# Patient Record
Sex: MALE | Employment: FULL TIME | URBAN - METROPOLITAN AREA
[De-identification: names, ages, dates, MRNs, and addresses within clinical notes are randomized per-mention and may not be internally consistent; named-entity substitution may affect disease eponyms.]

---

## 2024-09-12 ENCOUNTER — OFFICE VISIT (OUTPATIENT)
Dept: ORTHOPEDIC SURGERY | Facility: HOSPITAL | Age: 54
End: 2024-09-12
Payer: MEDICARE

## 2024-09-12 DIAGNOSIS — S46.211A BICEPS RUPTURE, DISTAL, RIGHT, INITIAL ENCOUNTER: Primary | ICD-10-CM

## 2024-09-12 PROCEDURE — 99213 OFFICE O/P EST LOW 20 MIN: CPT | Performed by: ORTHOPAEDIC SURGERY

## 2024-09-12 PROCEDURE — 1036F TOBACCO NON-USER: CPT | Performed by: ORTHOPAEDIC SURGERY

## 2024-09-12 PROCEDURE — 99203 OFFICE O/P NEW LOW 30 MIN: CPT | Performed by: ORTHOPAEDIC SURGERY

## 2024-09-12 RX ORDER — SODIUM CHLORIDE, SODIUM LACTATE, POTASSIUM CHLORIDE, CALCIUM CHLORIDE 600; 310; 30; 20 MG/100ML; MG/100ML; MG/100ML; MG/100ML
20 INJECTION, SOLUTION INTRAVENOUS CONTINUOUS
OUTPATIENT
Start: 2024-09-12

## 2024-09-12 ASSESSMENT — PAIN SCALES - GENERAL: PAINLEVEL_OUTOF10: 0 - NO PAIN

## 2024-09-12 ASSESSMENT — ENCOUNTER SYMPTOMS
MUSCULOSKELETAL NEGATIVE: 1
FATIGUE: 0
SHORTNESS OF BREATH: 0
HEMATOLOGIC/LYMPHATIC NEGATIVE: 1
FEVER: 0
CHILLS: 0
WHEEZING: 0

## 2024-09-12 ASSESSMENT — PAIN - FUNCTIONAL ASSESSMENT: PAIN_FUNCTIONAL_ASSESSMENT: 0-10

## 2024-09-12 NOTE — PROGRESS NOTES
Reason for Appointment  Chief Complaint   Patient presents with    Right Forearm - Pain     History of Present Illness  New patient is a 54 y.o. male here today for evaluation of right elbow pain, pleasant gentleman was lifting a dumbbell last Tuesday and felt a pop classic bruising for distal biceps rupture deformity.  Interesting note that sometime ago he had an injury when he was lifting and had a partial rupture but no bruising in the left elbow is not had any treatment this is weaker but the right elbow takes precedence never had an injury before no severe medical history not on any blood thinners pain is minimal but he feels weak has had bruising no numbness full history reviewed..     History reviewed. No pertinent past medical history.    History reviewed. No pertinent surgical history.    Medication Documentation Review Audit       Reviewed by Jose Barajas MD (Physician) on 09/12/24 at 1236      Medication Order Taking? Sig Documenting Provider Last Dose Status            No Medications to Display                                   No Known Allergies    Review of Systems   Constitutional:  Negative for chills, fatigue and fever.   Respiratory:  Negative for shortness of breath and wheezing.    Cardiovascular:  Negative for chest pain and leg swelling.   Musculoskeletal: Negative.    Skin: Negative.    Hematological: Negative.        Exam   On examination patient is alert awake in no acute distress oriented person place and time mood is good good shoulder wrist range of motion elbow shows bruising classic distal biceps rupture proximal migration of the muscle belly still good sensation pulses distally no elbow instability no other bony tenderness throughout the upper extremity  Assessment   Encounter Diagnosis   Name Primary?    Biceps rupture, distal, right, initial encounter Yes       Plan   I had a long discussion with the patient today and went into depth about open repair of the distal biceps and  reinsertion.  He understands the risk especially to the posterior interosseous nerve and the lateral antebrachial cutaneous nerve.  We talked about surgery and the long recovery phase he wishes to proceed informed consent obtained.  I would like to get a stat MRI to assure the amount of retraction and location of the biceps tendon and any other associated injuries that can help with surgical intervention.

## 2024-09-13 PROBLEM — S46.211A BICEPS RUPTURE, DISTAL, RIGHT, INITIAL ENCOUNTER: Status: ACTIVE | Noted: 2024-09-12

## 2024-09-16 ENCOUNTER — HOSPITAL ENCOUNTER (OUTPATIENT)
Dept: RADIOLOGY | Facility: HOSPITAL | Age: 54
Discharge: HOME | End: 2024-09-16
Payer: COMMERCIAL

## 2024-09-16 ENCOUNTER — PREP FOR PROCEDURE (OUTPATIENT)
Dept: ORTHOPEDIC SURGERY | Facility: CLINIC | Age: 54
End: 2024-09-16
Payer: COMMERCIAL

## 2024-09-16 DIAGNOSIS — S46.211A BICEPS RUPTURE, DISTAL, RIGHT, INITIAL ENCOUNTER: ICD-10-CM

## 2024-09-16 PROCEDURE — 73221 MRI JOINT UPR EXTREM W/O DYE: CPT | Mod: RIGHT SIDE | Performed by: RADIOLOGY

## 2024-09-16 PROCEDURE — 73221 MRI JOINT UPR EXTREM W/O DYE: CPT | Mod: RT

## 2024-09-17 ENCOUNTER — PRE-ADMISSION TESTING (OUTPATIENT)
Dept: PREADMISSION TESTING | Facility: HOSPITAL | Age: 54
End: 2024-09-17
Payer: COMMERCIAL

## 2024-09-17 ENCOUNTER — LAB (OUTPATIENT)
Dept: LAB | Facility: LAB | Age: 54
End: 2024-09-17
Payer: COMMERCIAL

## 2024-09-17 VITALS
HEART RATE: 75 BPM | WEIGHT: 229.5 LBS | SYSTOLIC BLOOD PRESSURE: 179 MMHG | RESPIRATION RATE: 16 BRPM | TEMPERATURE: 98.2 F | DIASTOLIC BLOOD PRESSURE: 93 MMHG | HEIGHT: 72 IN | BODY MASS INDEX: 31.08 KG/M2 | OXYGEN SATURATION: 96 %

## 2024-09-17 DIAGNOSIS — S46.211A BICEPS RUPTURE, DISTAL, RIGHT, INITIAL ENCOUNTER: ICD-10-CM

## 2024-09-17 DIAGNOSIS — Z01.818 PREOP TESTING: Primary | ICD-10-CM

## 2024-09-17 DIAGNOSIS — Z01.818 PREOP TESTING: ICD-10-CM

## 2024-09-17 LAB
ALBUMIN SERPL BCP-MCNC: 4.9 G/DL (ref 3.4–5)
ALP SERPL-CCNC: 62 U/L (ref 33–120)
ALT SERPL W P-5'-P-CCNC: 26 U/L (ref 10–52)
ANION GAP SERPL CALC-SCNC: 14 MMOL/L (ref 10–20)
AST SERPL W P-5'-P-CCNC: 23 U/L (ref 9–39)
ATRIAL RATE: 73 BPM
BASOPHILS # BLD AUTO: 0.05 X10*3/UL (ref 0–0.1)
BASOPHILS NFR BLD AUTO: 0.9 %
BILIRUB SERPL-MCNC: 0.4 MG/DL (ref 0–1.2)
BUN SERPL-MCNC: 17 MG/DL (ref 6–23)
CALCIUM SERPL-MCNC: 10.1 MG/DL (ref 8.6–10.3)
CHLORIDE SERPL-SCNC: 99 MMOL/L (ref 98–107)
CO2 SERPL-SCNC: 27 MMOL/L (ref 21–32)
CREAT SERPL-MCNC: 1.03 MG/DL (ref 0.5–1.3)
EGFRCR SERPLBLD CKD-EPI 2021: 87 ML/MIN/1.73M*2
EOSINOPHIL # BLD AUTO: 0.15 X10*3/UL (ref 0–0.7)
EOSINOPHIL NFR BLD AUTO: 2.6 %
ERYTHROCYTE [DISTWIDTH] IN BLOOD BY AUTOMATED COUNT: 11.2 % (ref 11.5–14.5)
GLUCOSE SERPL-MCNC: 111 MG/DL (ref 74–99)
HCT VFR BLD AUTO: 45.2 % (ref 41–52)
HGB BLD-MCNC: 16.2 G/DL (ref 13.5–17.5)
IMM GRANULOCYTES # BLD AUTO: 0.01 X10*3/UL (ref 0–0.7)
IMM GRANULOCYTES NFR BLD AUTO: 0.2 % (ref 0–0.9)
LYMPHOCYTES # BLD AUTO: 2.15 X10*3/UL (ref 1.2–4.8)
LYMPHOCYTES NFR BLD AUTO: 37.3 %
MCH RBC QN AUTO: 33.1 PG (ref 26–34)
MCHC RBC AUTO-ENTMCNC: 35.8 G/DL (ref 32–36)
MCV RBC AUTO: 92 FL (ref 80–100)
MONOCYTES # BLD AUTO: 0.5 X10*3/UL (ref 0.1–1)
MONOCYTES NFR BLD AUTO: 8.7 %
NEUTROPHILS # BLD AUTO: 2.9 X10*3/UL (ref 1.2–7.7)
NEUTROPHILS NFR BLD AUTO: 50.3 %
NRBC BLD-RTO: ABNORMAL /100{WBCS}
P AXIS: 70 DEGREES
P OFFSET: 195 MS
P ONSET: 137 MS
PLATELET # BLD AUTO: 391 X10*3/UL (ref 150–450)
POTASSIUM SERPL-SCNC: 4.1 MMOL/L (ref 3.5–5.3)
PR INTERVAL: 162 MS
PROT SERPL-MCNC: 7.8 G/DL (ref 6.4–8.2)
Q ONSET: 218 MS
QRS COUNT: 12 BEATS
QRS DURATION: 92 MS
QT INTERVAL: 392 MS
QTC CALCULATION(BAZETT): 431 MS
QTC FREDERICIA: 418 MS
R AXIS: 65 DEGREES
RBC # BLD AUTO: 4.9 X10*6/UL (ref 4.5–5.9)
SODIUM SERPL-SCNC: 136 MMOL/L (ref 136–145)
T AXIS: 54 DEGREES
T OFFSET: 414 MS
VENTRICULAR RATE: 73 BPM
WBC # BLD AUTO: 5.8 X10*3/UL (ref 4.4–11.3)

## 2024-09-17 PROCEDURE — 93005 ELECTROCARDIOGRAM TRACING: CPT

## 2024-09-17 PROCEDURE — 80053 COMPREHEN METABOLIC PANEL: CPT

## 2024-09-17 PROCEDURE — 36415 COLL VENOUS BLD VENIPUNCTURE: CPT

## 2024-09-17 PROCEDURE — 85025 COMPLETE CBC W/AUTO DIFF WBC: CPT

## 2024-09-17 PROCEDURE — 99203 OFFICE O/P NEW LOW 30 MIN: CPT

## 2024-09-17 PROCEDURE — 93010 ELECTROCARDIOGRAM REPORT: CPT | Performed by: INTERNAL MEDICINE

## 2024-09-17 RX ORDER — BISMUTH SUBSALICYLATE 262 MG
1 TABLET,CHEWABLE ORAL DAILY
COMMUNITY

## 2024-09-17 RX ORDER — IBUPROFEN 100 MG/5ML
1000 SUSPENSION, ORAL (FINAL DOSE FORM) ORAL DAILY
COMMUNITY

## 2024-09-17 ASSESSMENT — DUKE ACTIVITY SCORE INDEX (DASI)
CAN YOU WALK INDOORS, SUCH AS AROUND YOUR HOUSE: YES
CAN YOU TAKE CARE OF YOURSELF (EAT, DRESS, BATHE, OR USE TOILET): YES
CAN YOU WALK A BLOCK OR TWO ON LEVEL GROUND: YES
CAN YOU DO MODERATE WORK AROUND THE HOUSE LIKE VACUUMING, SWEEPING FLOORS OR CARRYING GROCERIES: YES
CAN YOU DO HEAVY WORK AROUND THE HOUSE LIKE SCRUBBING FLOORS OR LIFTING AND MOVING HEAVY FURNITURE: YES
CAN YOU RUN A SHORT DISTANCE: YES
CAN YOU DO LIGHT WORK AROUND THE HOUSE LIKE DUSTING OR WASHING DISHES: YES
CAN YOU CLIMB A FLIGHT OF STAIRS OR WALK UP A HILL: YES
TOTAL_SCORE: 58.2
CAN YOU HAVE SEXUAL RELATIONS: YES
DASI METS SCORE: 9.9
CAN YOU DO YARD WORK LIKE RAKING LEAVES, WEEDING OR PUSHING A MOWER: YES
CAN YOU PARTICIPATE IN STRENOUS SPORTS LIKE SWIMMING, SINGLES TENNIS, FOOTBALL, BASKETBALL, OR SKIING: YES
CAN YOU PARTICIPATE IN MODERATE RECREATIONAL ACTIVITIES LIKE GOLF, BOWLING, DANCING, DOUBLES TENNIS OR THROWING A BASEBALL OR FOOTBALL: YES

## 2024-09-17 ASSESSMENT — PAIN - FUNCTIONAL ASSESSMENT: PAIN_FUNCTIONAL_ASSESSMENT: 0-10

## 2024-09-17 ASSESSMENT — PAIN DESCRIPTION - DESCRIPTORS: DESCRIPTORS: SHARP

## 2024-09-17 NOTE — PREPROCEDURE INSTRUCTIONS
Medication List            Accurate as of September 17, 2024  7:37 AM. Always use your most recent med list.                ascorbic acid 1,000 mg tablet  Commonly known as: Vitamin C  Additional Medication Adjustments for Surgery: Take last dose 7 days before surgery  Notes to patient: Last dose preoperatively 9/10/2024     MOVE FREE ULTRA FASTER COMFORT ORAL  Additional Medication Adjustments for Surgery: Take last dose 7 days before surgery  Notes to patient: Last dose preoperatively 9/10/2024     multivitamin tablet  Additional Medication Adjustments for Surgery: Take last dose 7 days before surgery  Notes to patient: Last dose preoperatively 9/10/2024            NPO Instructions:     Do not eat any food after midnight the night before your surgery/procedure.  You may have clear liquids until TWO hours before surgery/procedure. This includes water, black tea/coffee, (no milk or cream) apple juice and electrolyte drinks (Gatorade).  You may chew gum up to TWO hours before your surgery/procedure.     Additional Instructions:      Seven/Six Days before Surgery:  Review your medication instructions, stop indicated medications  Five Days before Surgery:  Review your medication instructions, stop indicated medications  Three Days before Surgery:  Review your medication instructions, stop indicated medications  The Day before Surgery:  No smoking or alcohol use 24 hours before surgery  Review your medication instructions, stop indicated medications  You will be contacted regarding the time of your arrival to facility and surgery time  Do not eat any food after Midnight  Day of Surgery:  Review your medication instructions, take indicated medications  If you have diabetes, please check your fasting blood sugar upon awakening.  If fasting blood sugar is <80 mg/dl, drink 100 ml of apple juice, time limit of 2 hours before  You may have clear liquids until TWO hours before surgery/procedure.  This includes water, black  tea/coffee, (no milk or cream) apple juice and electrolyte drinks (Gatorade)  You may chew gum up to TWO hours before your surgery/procedure  Wear  comfortable loose fitting clothing  Do not use moisturizers, creams, lotions or perfume  All jewelry and valuables should be left at home     CONTACT SURGEON'S OFFICE IF YOU DEVELOP:  * Fever = 100.4 F   * New respiratory symptoms (e.g. cough, shortness of breath, respiratory distress, sore throat)  * Recent loss of taste or smell  *Flu like symptoms such as headache, fatigue or gastrointestinal symptoms  * You develop any open sores, shingles, burning or painful urination   AND/OR:  * You no longer wish to have the surgery.  * Any other personal circumstances change that may lead to the need to cancel or defer this surgery.  *You were admitted to any hospital within one week of your planned procedure.     SMOKING:  *Quitting smoking can make a huge difference to your health and recovery from surgery.    *If you need help with quitting, call 3-240-QUIT-NOW.     THE DAY BEFORE SURGERY:  *Do not eat any food after midnight the night before your surgery.   *You may have up to TEN OUNCES of clear liquids until TWO hours before your instructed ARRIVAL TIME to hospital. This includes water, black tea/coffee, (no milk or cream) apple juice, clear broth and electrolyte drinks (Gatorade). Please avoid clear liquids that are red in color.   *You may chew gum/mints up to TWO hours before your surgery/procedure.     SURGICAL TIME:  *You will be contacted between 2 p.m. and 3 p.m. the business day before your surgery with your arrival time.  *If you haven't received a call by 3pm, call [phone]  *Scheduled surgery times may change and you will be notified if this occurs-check your personal voicemail for any updates.     ON THE MORNING OF SURGERY:  *Wear comfortable, loose fitting clothing.   *Do not use moisturizers, creams, lotions or perfume.  *All jewelry and valuables  should be left at home.  *Prosthetic devices such as contact lenses, hearing aids, dentures, eyelash extensions, hairpins and body piercing must be removed before surgery.     BRING WITH YOU:  *Photo ID and insurance card  *Current list of medications and allergies  *Pacemaker/Defibrillator/Heart stent cards  *CPAP machine and mask  *Slings/splints/crutches  *Copy of your complete Advanced Directive/DHPOA-if applicable  *Neurostimulator implant remote     PARKING AND ARRIVAL:  *Check in at the Main Entrance desk and let them know you are here for surgery.     IF YOU ARE HAVING OUTPATIENT/SAME DAY SURGERY:  *A responsible adult MUST accompany you at the time of discharge and stay with you for 24 hours after your surgery.  *You may NOT drive yourself home after surgery.  *You may use a taxi or ride sharing service ([employer], [employer]) to return home ONLY if you are accompanied by a friend or family member.  *Instructions for resuming your medications will be provided by your surgeon.     Thank you for coming to Pre Admission testing.      If I have prescribe medication please don't forget to  at your pharmacy.      Any questions about today's visit call [phone] and leave a message in the general mailbox.     Patient instructed to ambulate as soon as possible postoperatively to decrease thromboembolic risk.     Royce Oden, APRN-CNP     Thank you for visiting [facility].  If you have any changes to your health condition, please call the surgeons office to alert them and give them details of your symptoms.        Preoperative Fasting Guidelines     Why must I stop eating and drinking near surgery time?  With sedation, food or liquid in your stomach can enter your lungs causing serious complications  Increases nausea and vomiting     When do I need to stop eating and drinking before my surgery?  Do not eat any food after midnight the night before your surgery/procedure.  You may have up  to TEN ounces of clear liquid until TWO hours before your instructed arrival time to the hospital.  This includes water, black tea/coffee, (no milk or cream) apple juice, and electrolyte drinks (Gatorade)  You may chew gum until TWO hours before your surgery/procedure        Additional Instructions:      The Day before Surgery:  -Review your medication instructions, stop indicated medications  -You will be contacted in the evening regarding the time of your arrival to facility and surgery time     Day of Surgery:  -Review your medication instructions, take indicated medications  -Wear comfortable loose fitting clothing  -Do not use moisturizers, creams, lotions or perfume  -All jewelry and valuables should be left at home                   Preoperative Brain Exercises     What are brain exercises?  A brain exercise is any activity that engages your thinking (cognitive) skills.     What types of activities are considered brain exercises?  Jigsaw puzzles, crossword puzzles, word jumble, memory games, word search, and many more.  Many can be found free online or on your phone via a mobile carola.     Why should I do brain exercises before my surgery?  More recent research has shown brain exercise before surgery can lower the risk of postoperative delirium (confusion) which can be especially important for older adults.  Patients who did brain exercises for 5 to 10 hours the days before surgery, cut their risk of postoperative delirium in half up to 1 week after surgery.                         [facility]     Preoperative Deep Breathing Exercises     Why it is important to do deep breathing exercises before my surgery?  Deep breathing exercises strengthen your breathing muscles.  This helps you to recover after your surgery and decreases the chance of breathing complications.        How are the deep breathing exercises done?  Sit straight with your back supported.  Breathe in deeply and slowly  through your nose. Your lower rib cage should expand and your abdomen may move forward.  Hold that breath for 3 to 5 seconds.  Breathe out through pursed lips, slowly and completely.  Rest and repeat 10 times every hour while awake.  Rest longer if you become dizzy or lightheaded.                      [facility]     Preoperative Deep Breathing Exercises     Why it is important to do deep breathing exercises before my surgery?  Deep breathing exercises strengthen your breathing muscles.  This helps you to recover after your surgery and decreases the chance of breathing complications.        How are the deep breathing exercises done?  Sit straight with your back supported.  Breathe in deeply and slowly through your nose. Your lower rib cage should expand and your abdomen may move forward.  Hold that breath for 3 to 5 seconds.  Breathe out through pursed lips, slowly and completely.  Rest and repeat 10 times every hour while awake.  Rest longer if you become dizzy or lightheaded.        Patient Information: Incentive Spirometer  What is an incentive spirometer?  An incentive spirometer is a device used before and after surgery to “exercise” your lungs.  It helps you to take deeper breaths to expand your lungs.  Below is an example of a basic incentive spirometer.  The device you receive may differ slightly but they all function the same.    Why do I need to use an incentive spirometer?  Using your incentive spirometer prepares your lungs for surgery and helps prevent lung problems after surgery.  How do I use my incentive spirometer?  When you're using your incentive spirometer, make sure to breathe through your mouth. If you breathe through your nose, the incentive spirometer won't work properly. You can hold your nose if you have trouble.  If you feel dizzy at any time, stop and rest. Try again at a later time.  Follow the steps below:  Set up your incentive spirometer, expand the flexible  tubing and connect to the outlet.  Sit upright in a chair or bed. Hold the incentive spirometer at eye level.   Put the mouthpiece in your mouth and close your lips tightly around it. Slowly breathe out (exhale) completely.  Breathe in (inhale) slowly through your mouth as deeply as you can. As you take a breath, you will see the piston rise inside the large column. While the piston rises, the indicator should move upwards. It should stay in between the 2 arrows (see Figure).  Try to get the piston as high as you can, while keeping the indicator between the arrows.   If the indicator doesn't stay between the arrows, you're breathing either too fast or too slow.  When you get it as high as you can, hold your breath for 10 seconds, or as long as possible. While you're holding your breath, the piston will slowly fall to the base of the spirometer.  Once the piston reaches the bottom of the spirometer, breathe out slowly through your mouth. Rest for a few seconds.  Repeat 10 times. Try to get the piston to the same level with each breath.  Repeat every hour while awake  You can carefully clean the outside of the mouthpiece with an alcohol wipe or soap and water.       Patient and Family Education             Ways You Can Help Prevent Blood Clots                    This handout explains some simple things you can do to help prevent blood clots.      Blood clots are blockages that can form in the body's veins. When a blood clot forms in your deep veins, it may be called a deep vein thrombosis, or DVT for short. Blood clots can happen in any part of the body where blood flows, but they are most common in the arms and legs. If a piece of a blood clot breaks free and travels to the lungs, it is called a pulmonary embolus (PE). A PE can be a very serious problem.         Being in the hospital or having surgery can raise your chances of getting a blood clot because you may not be well enough to move around as much as you  normally do.         Ways you can help prevent blood clots in the hospital           Wearing SCDs. SCDs stands for Sequential Compression Devices.   SCDs are special sleeves that wrap around your legs  They attach to a pump that fills them with air to gently squeeze your legs every few minutes.   This helps return the blood in your legs to your heart.   SCDs should only be taken off when walking or bathing.   SCDs may not be comfortable, but they can help save your life.                                            Wearing compression stockings - if your doctor orders them. These special snug fitting stockings gently squeeze your legs to help blood flow.       Walking. Walking helps move the blood in your legs.   If your doctor says it is ok, try walking the halls at least   5 times a day. Ask us to help you get up, so you don't fall.      Taking any blood thinning medicines your doctor orders.        Page 1 of 2            [facility]; 3/23   Ways you can help prevent blood clots at home         Wearing compression stockings - if your doctor orders them. ? Walking - to help move the blood in your legs.       Taking any blood thinning medicines your doctor orders.      Signs of a blood clot or PE        Tell your doctor or nurse know right away if you have of the problems listed below.    If you are at home, seek medical care right away. Call 911 for chest pain or problems breathing.                Signs of a blood clot (DVT) - such as pain,  swelling, redness or warmth in your arm or leg      Signs of a pulmonary embolism (PE) - such as chest pain or feeling short of breath

## 2024-09-17 NOTE — CPM/PAT H&P
CPM/PAT Evaluation       Name: Panchito Alcaraz (Panchito Alcaraz)  /Age: 1970/54 y.o.     In-Person       Chief Complaint: Biceps rupture, distal, right, initial encounter     HPI  Patient is an alert and oriented 54 year old male scheduled for a right bicep tendon repair on 2024 with Dr. Barajas for biceps rupture, distal, right, initial encounter. He endorses RUE pain that he rates at a 2/10 which worsens when lifting. PMHX includes obesity and HTN. Presents to [facility] [place] for perioperative risk stratification and optimization.     Past Medical History:   Diagnosis Date    HTN (hypertension)      Past Surgical History:   Procedure Laterality Date    HERNIA REPAIR       Patient Sexual activity questions deferred to the physician.    No family history on file.    No Known Allergies    Medication Documentation Review Audit       Reviewed by Jania Bella RN (Registered Nurse) on 24 at 0722      Medication Order Taking? Sig Documenting Provider Last Dose Status   ascorbic acid (Vitamin C) 1,000 mg tablet [id number] Yes Take 1 tablet (1,000 mg) by mouth once daily. Historical Provider, MD 2024 Active   calcium fructoborate (MOVE FREE ULTRA FASTER COMFORT ORAL) [id number] Yes Take 1 tablet by mouth once daily. Historical Provider, MD 2024 Active   multivitamin tablet [id number] Yes Take 1 tablet by mouth once daily. Historical Provider, MD 2024 Active                     Review of Systems   Constitutional: Negative for chills, decreased appetite, diaphoresis, fever and malaise/fatigue.   Eyes:  Negative for blurred vision and double vision.   Cardiovascular:  Negative for chest pain, claudication, cyanosis, dyspnea on exertion, irregular heartbeat, leg swelling, near-syncope and palpitations.   Respiratory:  Negative for cough, hemoptysis, shortness of breath and wheezing.    Endocrine: Negative for cold intolerance, heat intolerance, polydipsia, polyphagia and polyuria.    Gastrointestinal:  Negative for abdominal pain, constipation, diarrhea, dysphagia, nausea and vomiting.   Genitourinary:  Negative for bladder incontinence, dysuria, hematuria, incomplete emptying, nocturia, frequency, pelvic pain and urgency.   Neurological:  Negative for headaches, light-headedness, paresthesias, sensory change and weakness.   Psychiatric/Behavioral:  Negative for altered mental status.    Musculoskeletal: Negative for myalgias, arthralgias     Vitals and nursing note reviewed.     Physical exam  Constitutional:       Appearance: Normal appearance. He is Obese.   HENT:      Head: Normocephalic and atraumatic.      Mouth/Throat:      Mouth: Mucous membranes are moist.      Pharynx: Oropharynx is clear.   Eyes:      Extraocular Movements: Extraocular movements intact.      Conjunctiva/sclera: Conjunctivae normal.      Pupils: Pupils are equal, round, and reactive to light.   Cardiovascular:      PMI at left midclavicular line. Normal rate. Regular rhythm. Normal S1. Normal S2.       Murmurs: There is no murmur.      No gallop.  No click. No rub.       No audible carotid bruit     No lower extremity edema on exam  Pulmonary:      Effort: Pulmonary effort is normal.      Breath sounds: Normal breath sounds.   Abdominal:      General: Abdomen is flat. Bowel sounds are normal.      Palpations: Abdomen is soft and non-tender  Musculoskeletal:      Cervical back: Normal range of motion and neck supple.   Skin:     General: Skin is warm and dry.      Capillary Refill: Capillary refill takes less than 2 seconds.   Neurological:      General: No focal deficit present.      Mental Status: He is alert and oriented to person, place, and time. Mental status is at baseline.   Psychiatric:         Mood and Affect: Mood normal.         Behavior: Behavior normal.         Thought Content: Thought content normal.         Judgment: Judgment normal.     Vitals and nursing note reviewed. Physical exam within normal  "limits.     Visit Vitals  BP (!) 179/93   Pulse 75   Temp 36.8 °C (98.2 °F) (Temporal)   Resp 16   Ht 1.816 m (5' 11.5\")   Wt 104 kg (229 lb 8 oz)   SpO2 96%   BMI 31.56 kg/m²   Smoking Status Never   BSA 2.29 m²      DASI Risk Score      Flowsheet Row Most Recent Value   DASI SCORE 58.2   METS Score (Will be calculated only when all the questions are answered) 9.9          Caprini DVT Assessment      Flowsheet Row Most Recent Value   DVT Score 6   Current Status Major surgery planned, including arthroscopic and laproscopic (1-2 hours)   Age 40-59 years   BMI 31-40 (Obesity)          Modified Frailty Index      Flowsheet Row Most Recent Value   Modified Frailty Index Calculator 0          CHADS2 Stroke Risk         N/A 3 to 100%: High Risk   2 to < 3%: Medium Risk   0 to < 2%: Low Risk     Last Change: N/A          This score determines the patient's risk of having a stroke if the patient has atrial fibrillation.        This score is not applicable to this patient. Components are not calculated.          Revised Cardiac Risk Index      Flowsheet Row Most Recent Value   Revised Cardiac Risk Calculator 0          Apfel Simplified Score    No data to display       Risk Analysis Index Results This Encounter    No data found in the last 1 encounters.       Stop Bang Score      Flowsheet Row Most Recent Value   Do you snore loudly? 0   Do you often feel tired or fatigued after your sleep? 0   Has anyone ever observed you stop breathing in your sleep? 0   Do you have or are you being treated for high blood pressure? 0   Recent BMI (Calculated) 31.6   Is BMI greater than 35 kg/m2? 0=No   Age older than 50 years old? 1=Yes   Is your neck circumference greater than 17 inches (Male) or 16 inches (Female)? 1   Gender - Male 1=Yes   STOP-BANG Total Score 3          Assessment & Plan:    Neuro:  No diagnosis or significant findings on chart review or clinical presentation and evaluation.     HEENT/Airway:  No diagnosis or " significant findings on chart review or clinical presentation and evaluation.   STOP-BANG Score-3 points moderate risk for ATILIO    Mallampati::  II    TM distance::  >3 FB    Neck ROM::  Full  Dentures-denies  Crowns-reports x 4  Implants-denies    Cardiovascular:  No findings on chart review or clinical presentation and evaluation.   History of Hypertension-States he was treated for HTN in the past but medications were DC. BP in /93. Asymptomatic. Discussed case with anesthesia Dr Kirk, patient ok for procedure at this time. Patient was encouraged to self monitor his BP at home and report results to his PCP.    METS: 9.9  RCRI: 0 points, 3.9%  risk for postoperative MACE   ALYSA: 0.1% risk for postoperative MACE  EKG -normal EKG, normal sinus rhythm Rate-73 No acute changes    Pulmonary:  No diagnosis or significant findings on chart review or clinical presentation and evaluation.   ARISCAT: <26 points, 1.6% risk of in-hospital postoperative pulmonary complication  PRODIGY: Moderate risk for opioid induced respiratory depression  Smoking History-He quit smoking approximately 25 years ago. Previously smoked 1 PPD x 10 years  Discussed smoking cessation and deep breathing handout given    Renal/Urinary:  No diagnosis or significant findings on chart review or clinical presentation and evaluation, however, the patient is at increased risk of perioperative renal complications secondary to male sex and HTN. Preventative measures include BP monitoring, medication compliance, and hydration management.   CMP-Reviewed, stable  Creatinine-1.03  GFR-87    Endocrine:  No diagnosis or significant findings on chart review or clinical presentation and evaluation.     Hematologic/Immunology:  No diagnosis or significant findings on chart review or clinical presentation and evaluation.  The patient is not a Jehovah’s witness and will accept blood and blood products if medically indicated.   History of previous blood  transfusions No  CBC-Reviewed, stable  HGB-16.2  Caprini Score 6, patient at High for postoperative DVT. Pt supplied education/VTE handout  Anticoagulation use: No     Gastrointestinal:   No diagnosis or significant findings on chart review or clinical presentation and evaluation.   Recreational drug use: Drug use No  Alcohol use social drinker    Infectious disease:   No diagnosis or significant findings on chart review or clinical presentation and evaluation.     Musculoskeletal:   No significant findings on chart review or clinical presentation and evaluation.   History of Obesity-31.56  JHFRAT score-0 points. low risk for falls    Anesthesia:  ASA 2 - Patient with mild systemic disease with no functional limitations  Anticipated anesthesia-General  History of General anesthesia- yes  Complications- No anesthesia complications  No family history of anesthesia complications    Abnormalities noted on PAT evaluation: No    Labs & Imaging ordered:  CBC, CMP, EKG  Nickel/metal allergy-negative  Shellfish allergy-negative    Overall, patient Moderate Risk for the scheduled Moderate Risk surgery. Discussed with patient medication instructions, NPO guidelines, and any questions or concerns.     Face to face time-30 minutes

## 2024-09-17 NOTE — H&P (VIEW-ONLY)
CPM/PAT Evaluation       Name: Panchito Alcaraz (Panchito Alcaraz)  /Age: 1970/54 y.o.     In-Person       Chief Complaint: Biceps rupture, distal, right, initial encounter     HPI  Patient is an alert and oriented 54 year old male scheduled for a right bicep tendon repair on 2024 with Dr. Barajas for biceps rupture, distal, right, initial encounter. He endorses RUE pain that he rates at a 2/10 which worsens when lifting. PMHX includes obesity and HTN. [place] [facility] [place] perioperative risk stratification and optimization.     Past Medical History:   Diagnosis Date    HTN (hypertension)      Past Surgical History:   Procedure Laterality Date    HERNIA REPAIR       Patient Sexual activity questions deferred to the physician.    No family history on file.    No Known Allergies    Medication Documentation Review Audit       Reviewed by Jania Bella RN (Registered Nurse) on 24 at 0722      Medication Order Taking? Sig Documenting Provider Last Dose Status   ascorbic acid (Vitamin C) 1,000 mg tablet [id number] Yes Take 1 tablet (1,000 mg) by mouth once daily. Historical Provider, MD 2024 Active   calcium fructoborate (MOVE FREE ULTRA FASTER COMFORT ORAL) [id number] Yes Take 1 tablet by mouth once daily. Historical Provider, MD 2024 Active   multivitamin tablet [id number] Yes Take 1 tablet by mouth once daily. Historical Provider, MD 2024 Active                     Review of Systems   Constitutional: Negative for chills, decreased appetite, diaphoresis, fever and malaise/fatigue.   Eyes:  Negative for blurred vision and double vision.   Cardiovascular:  Negative for chest pain, claudication, cyanosis, dyspnea on exertion, irregular heartbeat, leg swelling, near-syncope and palpitations.   Respiratory:  Negative for cough, hemoptysis, shortness of breath and wheezing.    Endocrine: Negative for cold intolerance, heat intolerance, polydipsia, polyphagia and polyuria.    Gastrointestinal:  Negative for abdominal pain, constipation, diarrhea, dysphagia, nausea and vomiting.   Genitourinary:  Negative for bladder incontinence, dysuria, hematuria, incomplete emptying, nocturia, frequency, pelvic pain and urgency.   Neurological:  Negative for headaches, light-headedness, paresthesias, sensory change and weakness.   Psychiatric/Behavioral:  Negative for altered mental status.    Musculoskeletal: Negative for myalgias, arthralgias     Vitals and nursing note reviewed.     Physical exam  Constitutional:       Appearance: Normal appearance. He is Obese.   HENT:      Head: Normocephalic and atraumatic.      Mouth/Throat:      Mouth: Mucous membranes are moist.      Pharynx: Oropharynx is clear.   Eyes:      Extraocular Movements: Extraocular movements intact.      Conjunctiva/sclera: Conjunctivae normal.      Pupils: Pupils are equal, round, and reactive to light.   Cardiovascular:      PMI at left midclavicular line. Normal rate. Regular rhythm. Normal S1. Normal S2.       Murmurs: There is no murmur.      No gallop.  No click. No rub.       No audible carotid bruit     No lower extremity edema on exam  Pulmonary:      Effort: Pulmonary effort is normal.      Breath sounds: Normal breath sounds.   Abdominal:      General: Abdomen is flat. Bowel sounds are normal.      Palpations: Abdomen is soft and non-tender  Musculoskeletal:      Cervical back: Normal range of motion and neck supple.   Skin:     General: Skin is warm and dry.      Capillary Refill: Capillary refill takes less than 2 seconds.   Neurological:      General: No focal deficit present.      Mental Status: He is alert and oriented to person, place, and time. Mental status is at baseline.   Psychiatric:         Mood and Affect: Mood normal.         Behavior: Behavior normal.         Thought Content: Thought content normal.         Judgment: Judgment normal.     Vitals and nursing note reviewed. Physical exam within normal  "limits.     Visit Vitals  BP (!) 179/93   Pulse 75   Temp 36.8 °C (98.2 °F) (Temporal)   Resp 16   Ht 1.816 m (5' 11.5\")   Wt 104 kg (229 lb 8 oz)   SpO2 96%   BMI 31.56 kg/m²   Smoking Status Never   BSA 2.29 m²      DASI Risk Score      Flowsheet Row Most Recent Value   DASI SCORE 58.2   METS Score (Will be calculated only when all the questions are answered) 9.9          Caprini DVT Assessment      Flowsheet Row Most Recent Value   DVT Score 6   Current Status Major surgery planned, including arthroscopic and laproscopic (1-2 hours)   Age 40-59 years   BMI 31-40 (Obesity)          Modified Frailty Index      Flowsheet Row Most Recent Value   Modified Frailty Index Calculator 0          CHADS2 Stroke Risk         N/A 3 to 100%: High Risk   2 to < 3%: Medium Risk   0 to < 2%: Low Risk     Last Change: N/A          This score determines the patient's risk of having a stroke if the patient has atrial fibrillation.        This score is not applicable to this patient. Components are not calculated.          Revised Cardiac Risk Index      Flowsheet Row Most Recent Value   Revised Cardiac Risk Calculator 0          Apfel Simplified Score    No data to display       Risk Analysis Index Results This Encounter    No data found in the last 1 encounters.       Stop Bang Score      Flowsheet Row Most Recent Value   Do you snore loudly? 0   Do you often feel tired or fatigued after your sleep? 0   Has anyone ever observed you stop breathing in your sleep? 0   Do you have or are you being treated for high blood pressure? 0   Recent BMI (Calculated) 31.6   Is BMI greater than 35 kg/m2? 0=No   Age older than 50 years old? 1=Yes   Is your neck circumference greater than 17 inches (Male) or 16 inches (Female)? 1   Gender - Male 1=Yes   STOP-BANG Total Score 3          Assessment & Plan:    Neuro:  No diagnosis or significant findings on chart review or clinical presentation and evaluation.     HEENT/Airway:  No diagnosis or " significant findings on chart review or clinical presentation and evaluation.   STOP-BANG Score-3 points moderate risk for ATILIO    Mallampati::  II    TM distance::  >3 FB    Neck ROM::  Full  Dentures-denies  Crowns-reports x 4  Implants-denies    Cardiovascular:  No findings on chart review or clinical presentation and evaluation.   History of Hypertension-States he was treated for HTN in the past but medications were DC. BP in /93. Asymptomatic. Discussed case with anesthesia Dr Kirk, patient ok for procedure at this time. Patient was encouraged to self monitor his BP at home and report results to his PCP.    METS: 9.9  RCRI: 0 points, 3.9%  risk for postoperative MACE   ALYSA: 0.1% risk for postoperative MACE  EKG -normal EKG, normal sinus rhythm Rate-73 No acute changes    Pulmonary:  No diagnosis or significant findings on chart review or clinical presentation and evaluation.   ARISCAT: <26 points, 1.6% risk of in-hospital postoperative pulmonary complication  PRODIGY: Moderate risk for opioid induced respiratory depression  Smoking History-He quit smoking approximately 25 years ago. Previously smoked 1 PPD x 10 years  Discussed smoking cessation and deep breathing handout given    Renal/Urinary:  No diagnosis or significant findings on chart review or clinical presentation and evaluation, however, the patient is at increased risk of perioperative renal complications secondary to male sex and HTN. Preventative measures include BP monitoring, medication compliance, and hydration management.   CMP-Reviewed, stable  Creatinine-1.03  GFR-87    Endocrine:  No diagnosis or significant findings on chart review or clinical presentation and evaluation.     Hematologic/Immunology:  No diagnosis or significant findings on chart review or clinical presentation and evaluation.  The patient is not a Jehovah’s witness and will accept blood and blood products if medically indicated.   History of previous blood  transfusions No  CBC-Reviewed, stable  HGB-16.2  Caprini Score 6, patient at High for postoperative DVT. Pt supplied education/VTE handout  Anticoagulation use: No     Gastrointestinal:   No diagnosis or significant findings on chart review or clinical presentation and evaluation.   Recreational drug use: Drug use No  Alcohol use social drinker    Infectious disease:   No diagnosis or significant findings on chart review or clinical presentation and evaluation.     Musculoskeletal:   No significant findings on chart review or clinical presentation and evaluation.   History of Obesity-31.56  JHFRAT score-0 points. low risk for falls    Anesthesia:  ASA 2 - Patient with mild systemic disease with no functional limitations  Anticipated anesthesia-General  History of General anesthesia- yes  Complications- No anesthesia complications  No family history of anesthesia complications    Abnormalities noted on PAT evaluation: No    Labs & Imaging ordered:  CBC, CMP, EKG  Nickel/metal allergy-negative  Shellfish allergy-negative    Overall, patient Moderate Risk for the scheduled Moderate Risk surgery. Discussed with patient medication instructions, NPO guidelines, and any questions or concerns.     Face to face time-30 minutes

## 2024-09-18 ENCOUNTER — ANESTHESIA EVENT (OUTPATIENT)
Dept: OPERATING ROOM | Facility: HOSPITAL | Age: 54
End: 2024-09-18
Payer: COMMERCIAL

## 2024-09-18 ENCOUNTER — HOSPITAL ENCOUNTER (OUTPATIENT)
Facility: HOSPITAL | Age: 54
Setting detail: OUTPATIENT SURGERY
Discharge: HOME | End: 2024-09-18
Attending: ORTHOPAEDIC SURGERY | Admitting: ORTHOPAEDIC SURGERY
Payer: COMMERCIAL

## 2024-09-18 ENCOUNTER — ANESTHESIA (OUTPATIENT)
Dept: OPERATING ROOM | Facility: HOSPITAL | Age: 54
End: 2024-09-18
Payer: COMMERCIAL

## 2024-09-18 VITALS
RESPIRATION RATE: 16 BRPM | TEMPERATURE: 97.7 F | DIASTOLIC BLOOD PRESSURE: 83 MMHG | HEIGHT: 72 IN | WEIGHT: 229.28 LBS | SYSTOLIC BLOOD PRESSURE: 144 MMHG | OXYGEN SATURATION: 97 % | HEART RATE: 72 BPM | BODY MASS INDEX: 31.05 KG/M2

## 2024-09-18 DIAGNOSIS — S46.211A BICEPS RUPTURE, DISTAL, RIGHT, INITIAL ENCOUNTER: Primary | ICD-10-CM

## 2024-09-18 PROBLEM — I10 HTN (HYPERTENSION): Status: ACTIVE | Noted: 2024-09-18

## 2024-09-18 PROCEDURE — 2500000005 HC RX 250 GENERAL PHARMACY W/O HCPCS: Performed by: NURSE ANESTHETIST, CERTIFIED REGISTERED

## 2024-09-18 PROCEDURE — 24342 REPAIR OF RUPTURED TENDON: CPT | Performed by: PHYSICIAN ASSISTANT

## 2024-09-18 PROCEDURE — 3700000002 HC GENERAL ANESTHESIA TIME - EACH INCREMENTAL 1 MINUTE: Performed by: ORTHOPAEDIC SURGERY

## 2024-09-18 PROCEDURE — 2720000007 HC OR 272 NO HCPCS: Performed by: ORTHOPAEDIC SURGERY

## 2024-09-18 PROCEDURE — 2500000004 HC RX 250 GENERAL PHARMACY W/ HCPCS (ALT 636 FOR OP/ED): Performed by: STUDENT IN AN ORGANIZED HEALTH CARE EDUCATION/TRAINING PROGRAM

## 2024-09-18 PROCEDURE — 7100000001 HC RECOVERY ROOM TIME - INITIAL BASE CHARGE: Performed by: ORTHOPAEDIC SURGERY

## 2024-09-18 PROCEDURE — 3600000008 HC OR TIME - EACH INCREMENTAL 1 MINUTE - PROCEDURE LEVEL THREE: Performed by: ORTHOPAEDIC SURGERY

## 2024-09-18 PROCEDURE — 7100000009 HC PHASE TWO TIME - INITIAL BASE CHARGE: Performed by: ORTHOPAEDIC SURGERY

## 2024-09-18 PROCEDURE — 2500000004 HC RX 250 GENERAL PHARMACY W/ HCPCS (ALT 636 FOR OP/ED): Performed by: ANESTHESIOLOGY

## 2024-09-18 PROCEDURE — 24342 REPAIR OF RUPTURED TENDON: CPT | Performed by: ORTHOPAEDIC SURGERY

## 2024-09-18 PROCEDURE — 2500000005 HC RX 250 GENERAL PHARMACY W/O HCPCS: Performed by: ORTHOPAEDIC SURGERY

## 2024-09-18 PROCEDURE — A24342 PR REINSERT BI/TRICEPS TENDON,DISTAL: Performed by: NURSE ANESTHETIST, CERTIFIED REGISTERED

## 2024-09-18 PROCEDURE — C1713 ANCHOR/SCREW BN/BN,TIS/BN: HCPCS | Performed by: ORTHOPAEDIC SURGERY

## 2024-09-18 PROCEDURE — 2500000001 HC RX 250 WO HCPCS SELF ADMINISTERED DRUGS (ALT 637 FOR MEDICARE OP): Performed by: ORTHOPAEDIC SURGERY

## 2024-09-18 PROCEDURE — 2500000004 HC RX 250 GENERAL PHARMACY W/ HCPCS (ALT 636 FOR OP/ED): Performed by: ORTHOPAEDIC SURGERY

## 2024-09-18 PROCEDURE — 7100000002 HC RECOVERY ROOM TIME - EACH INCREMENTAL 1 MINUTE: Performed by: ORTHOPAEDIC SURGERY

## 2024-09-18 PROCEDURE — 3700000001 HC GENERAL ANESTHESIA TIME - INITIAL BASE CHARGE: Performed by: ORTHOPAEDIC SURGERY

## 2024-09-18 PROCEDURE — A24342 PR REINSERT BI/TRICEPS TENDON,DISTAL: Performed by: ANESTHESIOLOGY

## 2024-09-18 PROCEDURE — 2780000003 HC OR 278 NO HCPCS: Performed by: ORTHOPAEDIC SURGERY

## 2024-09-18 PROCEDURE — 3600000003 HC OR TIME - INITIAL BASE CHARGE - PROCEDURE LEVEL THREE: Performed by: ORTHOPAEDIC SURGERY

## 2024-09-18 PROCEDURE — 2500000004 HC RX 250 GENERAL PHARMACY W/ HCPCS (ALT 636 FOR OP/ED): Performed by: NURSE ANESTHETIST, CERTIFIED REGISTERED

## 2024-09-18 PROCEDURE — 7100000010 HC PHASE TWO TIME - EACH INCREMENTAL 1 MINUTE: Performed by: ORTHOPAEDIC SURGERY

## 2024-09-18 DEVICE — IMPL DELIVERY SYS,DISTAL BICEPS, BC
Type: IMPLANTABLE DEVICE | Site: ELBOW | Status: FUNCTIONAL
Brand: ARTHREX®

## 2024-09-18 RX ORDER — ONDANSETRON HYDROCHLORIDE 2 MG/ML
INJECTION, SOLUTION INTRAVENOUS AS NEEDED
Status: DISCONTINUED | OUTPATIENT
Start: 2024-09-18 | End: 2024-09-18

## 2024-09-18 RX ORDER — KETOROLAC TROMETHAMINE 30 MG/ML
30 INJECTION, SOLUTION INTRAMUSCULAR; INTRAVENOUS ONCE
Status: COMPLETED | OUTPATIENT
Start: 2024-09-18 | End: 2024-09-18

## 2024-09-18 RX ORDER — CEFAZOLIN SODIUM 2 G/100ML
INJECTION, SOLUTION INTRAVENOUS AS NEEDED
Status: DISCONTINUED | OUTPATIENT
Start: 2024-09-18 | End: 2024-09-18

## 2024-09-18 RX ORDER — ONDANSETRON HYDROCHLORIDE 2 MG/ML
4 INJECTION, SOLUTION INTRAVENOUS ONCE AS NEEDED
Status: DISCONTINUED | OUTPATIENT
Start: 2024-09-18 | End: 2024-09-18 | Stop reason: HOSPADM

## 2024-09-18 RX ORDER — ALBUTEROL SULFATE 0.83 MG/ML
2.5 SOLUTION RESPIRATORY (INHALATION) ONCE AS NEEDED
Status: DISCONTINUED | OUTPATIENT
Start: 2024-09-18 | End: 2024-09-18 | Stop reason: HOSPADM

## 2024-09-18 RX ORDER — PROPOFOL 10 MG/ML
INJECTION, EMULSION INTRAVENOUS AS NEEDED
Status: DISCONTINUED | OUTPATIENT
Start: 2024-09-18 | End: 2024-09-18

## 2024-09-18 RX ORDER — HYDROCODONE BITARTRATE AND ACETAMINOPHEN 5; 325 MG/1; MG/1
1 TABLET ORAL EVERY 6 HOURS PRN
Qty: 20 TABLET | Refills: 0 | Status: SHIPPED | OUTPATIENT
Start: 2024-09-18 | End: 2024-09-23

## 2024-09-18 RX ORDER — BACITRACIN ZINC 500 UNIT/G
OINTMENT IN PACKET (EA) TOPICAL AS NEEDED
Status: DISCONTINUED | OUTPATIENT
Start: 2024-09-18 | End: 2024-09-18 | Stop reason: HOSPADM

## 2024-09-18 RX ORDER — SODIUM CHLORIDE, SODIUM LACTATE, POTASSIUM CHLORIDE, CALCIUM CHLORIDE 600; 310; 30; 20 MG/100ML; MG/100ML; MG/100ML; MG/100ML
100 INJECTION, SOLUTION INTRAVENOUS CONTINUOUS
Status: DISCONTINUED | OUTPATIENT
Start: 2024-09-18 | End: 2024-09-18 | Stop reason: HOSPADM

## 2024-09-18 RX ORDER — OXYCODONE HYDROCHLORIDE 5 MG/1
5 TABLET ORAL EVERY 4 HOURS PRN
Status: DISCONTINUED | OUTPATIENT
Start: 2024-09-18 | End: 2024-09-18 | Stop reason: HOSPADM

## 2024-09-18 RX ORDER — LIDOCAINE HYDROCHLORIDE 10 MG/ML
INJECTION, SOLUTION EPIDURAL; INFILTRATION; INTRACAUDAL; PERINEURAL AS NEEDED
Status: DISCONTINUED | OUTPATIENT
Start: 2024-09-18 | End: 2024-09-18

## 2024-09-18 RX ORDER — HYDROMORPHONE HYDROCHLORIDE 0.2 MG/ML
0.2 INJECTION INTRAMUSCULAR; INTRAVENOUS; SUBCUTANEOUS EVERY 5 MIN PRN
Status: DISCONTINUED | OUTPATIENT
Start: 2024-09-18 | End: 2024-09-18 | Stop reason: HOSPADM

## 2024-09-18 RX ORDER — FENTANYL CITRATE 50 UG/ML
50 INJECTION, SOLUTION INTRAMUSCULAR; INTRAVENOUS ONCE
Status: COMPLETED | OUTPATIENT
Start: 2024-09-18 | End: 2024-09-18

## 2024-09-18 RX ORDER — OXYCODONE HYDROCHLORIDE 5 MG/1
10 TABLET ORAL EVERY 4 HOURS PRN
Status: DISCONTINUED | OUTPATIENT
Start: 2024-09-18 | End: 2024-09-18 | Stop reason: HOSPADM

## 2024-09-18 RX ORDER — SODIUM CHLORIDE, SODIUM LACTATE, POTASSIUM CHLORIDE, CALCIUM CHLORIDE 600; 310; 30; 20 MG/100ML; MG/100ML; MG/100ML; MG/100ML
20 INJECTION, SOLUTION INTRAVENOUS CONTINUOUS
Status: DISCONTINUED | OUTPATIENT
Start: 2024-09-18 | End: 2024-09-18 | Stop reason: HOSPADM

## 2024-09-18 RX ORDER — ACETAMINOPHEN 325 MG/1
650 TABLET ORAL EVERY 4 HOURS PRN
Status: DISCONTINUED | OUTPATIENT
Start: 2024-09-18 | End: 2024-09-18 | Stop reason: HOSPADM

## 2024-09-18 RX ORDER — MIDAZOLAM HYDROCHLORIDE 1 MG/ML
2 INJECTION, SOLUTION INTRAMUSCULAR; INTRAVENOUS ONCE
Status: COMPLETED | OUTPATIENT
Start: 2024-09-18 | End: 2024-09-18

## 2024-09-18 RX ORDER — DOXYCYCLINE 100 MG/1
100 CAPSULE ORAL 2 TIMES DAILY
Qty: 14 CAPSULE | Refills: 0 | Status: SHIPPED | OUTPATIENT
Start: 2024-09-18 | End: 2024-09-25

## 2024-09-18 ASSESSMENT — PAIN SCALES - GENERAL
PAINLEVEL_OUTOF10: 4
PAINLEVEL_OUTOF10: 5 - MODERATE PAIN
PAINLEVEL_OUTOF10: 6
PAINLEVEL_OUTOF10: 7
PAIN_LEVEL: 5
PAINLEVEL_OUTOF10: 6
PAINLEVEL_OUTOF10: 4
PAINLEVEL_OUTOF10: 0 - NO PAIN
PAINLEVEL_OUTOF10: 7
PAINLEVEL_OUTOF10: 4
PAINLEVEL_OUTOF10: 5 - MODERATE PAIN

## 2024-09-18 ASSESSMENT — PAIN - FUNCTIONAL ASSESSMENT
PAIN_FUNCTIONAL_ASSESSMENT: 0-10

## 2024-09-18 ASSESSMENT — COLUMBIA-SUICIDE SEVERITY RATING SCALE - C-SSRS
6. HAVE YOU EVER DONE ANYTHING, STARTED TO DO ANYTHING, OR PREPARED TO DO ANYTHING TO END YOUR LIFE?: NO
1. IN THE PAST MONTH, HAVE YOU WISHED YOU WERE DEAD OR WISHED YOU COULD GO TO SLEEP AND NOT WAKE UP?: NO
2. HAVE YOU ACTUALLY HAD ANY THOUGHTS OF KILLING YOURSELF?: NO

## 2024-09-18 NOTE — ANESTHESIA PREPROCEDURE EVALUATION
Patient: Panchito Alcaraz    Procedure Information       Date/Time: 09/18/24 1300    Procedure: Repair Tendon with or without Tendon Graft Upper Extremity (Right: Elbow)    Location: [facility] / [facility]    Surgeons: Jose Barajas MD            Relevant Problems   Anesthesia (within normal limits)      Cardiac   (+) HTN (hypertension)      Pulmonary (within normal limits)      Neuro (within normal limits)      GI (within normal limits)      /Renal (within normal limits)      Liver (within normal limits)      Endocrine (within normal limits)      Hematology (within normal limits)      Musculoskeletal (within normal limits)      HEENT (within normal limits)      ID (within normal limits)      Skin (within normal limits)      GYN (within normal limits)     Past Medical History:   Diagnosis Date    HTN (hypertension)      Past Surgical History:   Procedure Laterality Date    HERNIA REPAIR       No Known Allergies    Clinical information reviewed:   Tobacco  Allergies  Meds   Med Hx  Surg Hx   Fam Hx  Soc Hx        NPO Detail:  NPO/Void Status  Date of Last Liquid: 09/18/24  Time of Last Liquid: 0900 (black coffee and bottle of water with powder electrolyte additive, anesthesia notified)  Date of Last Solid: 09/18/24  Time of Last Solid: 2230  Time of Last Void: 1110         Physical Exam    Airway  Mallampati: II  TM distance: >3 FB  Neck ROM: full     Cardiovascular   Rhythm: regular  Rate: normal     Dental    Pulmonary   Breath sounds clear to auscultation     Abdominal            Anesthesia Plan    History of general anesthesia?: yes  History of complications of general anesthesia?: no    ASA 2     general and regional     intravenous induction   Postoperative administration of opioids is intended.  Anesthetic plan and risks discussed with patient.  Use of blood products discussed with patient who.

## 2024-09-18 NOTE — ANESTHESIA POSTPROCEDURE EVALUATION
Patient: Panchito Alcaraz    Procedure Summary       Date: 09/18/24 Room / Location: [facility] / [facility]    Anesthesia Start: 1311 Anesthesia Stop: 1431    Procedure: Repair Tendon with or without Tendon Graft Upper Extremity (Right: Elbow) Diagnosis:       Biceps rupture, distal, right, initial encounter      (Biceps rupture, distal, right, initial encounter [S46.211A])    Surgeons: Jose Barajas MD Responsible Provider: Federica Kirk MD MPH    Anesthesia Type: general, regional ASA Status: 2            Anesthesia Type: general, regional    Vitals Value Taken Time   /75 09/18/24 1446   Temp 36.3 °C (97.3 °F) 09/18/24 1426   Pulse 69 09/18/24 1446   Resp 13 09/18/24 1446   SpO2 93 % 09/18/24 1446       Anesthesia Post Evaluation    Patient location during evaluation: PACU  Patient participation: complete - patient participated  Level of consciousness: awake and alert  Pain score: 5  Pain management: adequate  Multimodal analgesia pain management approach  Airway patency: patent  Two or more strategies used to mitigate risk of obstructive sleep apnea  Cardiovascular status: acceptable  Respiratory status: acceptable  Hydration status: acceptable  Postoperative Nausea and Vomiting: none    There were no known notable events for this encounter.

## 2024-09-18 NOTE — OP NOTE
Repair Tendon with or without Tendon Graft Upper Extremity (R) Operative Note     Date: 2024  OR Location: [facility]    Name: Panchito Alcaraz, : 1970, Age: 54 y.o., MRN: [id number], Sex: male    Diagnosis  Pre-op Diagnosis      * Biceps rupture, distal, right, initial encounter [S46.211A] Post-op Diagnosis     * Biceps rupture, distal, right, initial encounter [S46.211A]     Procedures  Repair Tendon with or without Tendon Graft Upper Extremity  73721 - TX RINSJ RPTD BICEPS/TRICEPS TDN DSTL W/WO TDN GRF  Right distal biceps reinsertion with Arthrex Endobutton    Surgeons      * Jose Barajas - Primary    Resident/Fellow/Other Assistant:  Surgeons and Role:  * No surgeons found with a matching role *  Ann Taylor PA-C  Procedure Summary  Anesthesia: Regional, General  ASA: II  Anesthesia Staff: Anesthesiologist: Federica Kirk MD MPH; Lata Vora MD  CRNA: HODAN Pelaez-CRNA  Estimated Blood Loss: 5mL  Intra-op Medications:   Administrations occurring from 1300 to 1435 on 24:   Medication Name Total Dose   BUPivacaine HCl (Marcaine) 0.5 % (5 mg/mL) 10 mL, lidocaine (Xylocaine) 10 mL syringe 19 mL   lactated Ringer's infusion Cannot be calculated   fentaNYL PF (Sublimaze) injection 50 mcg 50 mcg   midazolam (Versed) injection 2 mg 2 mg              Anesthesia Record               Intraprocedure I/O Totals       None           Specimen: No specimens collected     Staff:   Circulator: Elida  Scrub Person: Ann  Scrub Person: Yanelis         Drains and/or Catheters: * None in log *    Tourniquet Times:     Total Tourniquet Time Documented:  Arm - Upper (Right) - 21 minutes  Total: Arm - Upper (Right) - 21 minutes      Implants:  Implants       Type Name Action Serial No.      [employer] SYSTEM, IMPLANT DELIVERY, BIOCOMPOSITE, DISTAL BICEPS REPAIR - [id number] Implanted               Findings: Torn and retracted biceps    Indications: Panchito Alcaraz is an 54 y.o. male who is having surgery  for Biceps rupture, distal, right, initial encounter [S46.211A].  Pleasant gentleman understands clearly that there are severe risk with distal biceps reinsertion such as nerve artery tendon damage infection continued pain especially damage to the radial nerve.  Wish to proceed understands a long postoperative course and variable results in terms of strength regain and pain relief.  Wish to proceed.  Informed consent obtained    The patient was seen in the preoperative area. The risks, benefits, complications, treatment options, non-operative alternatives, expected recovery and outcomes were discussed with the patient. The possibilities of reaction to medication, pulmonary aspiration, injury to surrounding structures, bleeding, recurrent infection, the need for additional procedures, failure to diagnose a condition, and creating a complication requiring transfusion or operation were discussed with the patient. The patient concurred with the proposed plan, giving informed consent.  The site of surgery was properly noted/marked if necessary per policy. The patient has been actively warmed in preoperative area. Preoperative antibiotics have been ordered and given within 1 hours of incision. Venous thrombosis prophylaxis have been ordered including bilateral sequential compression devices    Procedure Details: Pleasant gentleman brought to the operating room after sterilely prepping draping and performing a timeout we made first more proximal incision after raising the tourniquet and exsanguinating after placed abundant local proximal and distal to the elbow flexion crease.  We first went proximal and found the tendon retracted deep to the fascia and this was severely retracted and torn we debrided this back to a good stable edge and did a whipstitch tendon we followed bluntly down to the tuberosity where there was a nice tract but this was subacute in nature with some retraction we then made a 5 cm forearm incision  keeping the arm in supination at all times right down on the tuberosity.  We went right down and saw the old stump and debrided this area we placed our Endobutton pin through both cortices aiming volar and ulnar carefully going through and then just reamed down proximal cortex to 7 mm but not all the way through we then brought our tendon down through the tunnel but came through nicely with no undue tension right back on the tuberosity passing our Endobutton carefully making sure it was secured on the distal cortex.  We then brought this down after decreasing the tourniquet and this came right down onto the tuberosity with good abutment we placed 1 limb back through the tendon and secured this as secondary fixation.  We had good tension back on the biceps.  At this point the tourniquet was released and we did copious chlorhexidine irrigation good hemostasis good vascularity of the hand layered closure was performed.  He was placed in a posterior splint and 45 degrees of flexion he understands clearly postoperative care and we will get him into therapy in a week to get him into a removable splint he understands postoperative care with strict elevation any problems call immediately Ann Taylor PA-C acted as surgical assistant during this case and her assistance greatly reduced operative time and aided in performance of the case  Complications:  None; patient tolerated the procedure well.    Disposition: PACU - hemodynamically stable.  Condition: stable         Additional Details: 0    Attending Attestation: I performed the procedure.    Jose Barajas  Phone Number: [phone]

## 2024-09-18 NOTE — DISCHARGE INSTRUCTIONS
You had an elbow surgery today.  Local anesthesia was used during the procedure and you may have some numbness in the region for a few hours postoperatively.    Please leave the splint intact after surgery, if it gets too tight you may loosen the Ace wrap and cotton underneath but do not remove fully.    It is important to elevate the hand for the next 3 to 5 days, and move your fingers fully.  Ok to move the shoulder fully    If able, you can take 800 mg of ibuprofen along with the prescription pain medication, you can alternate these every 4 hours and slowly wean off of the prescription pain medication.    Please call the office for a postop appointment in 10 to 14 days after surgery.

## 2024-09-18 NOTE — ANESTHESIA PROCEDURE NOTES
Airway  Date/Time: 9/18/2024 1:17 PM  Urgency: elective    Airway not difficult    Staffing  Performed: CRNA   Authorized by: Lata Vora MD    Performed by: HODAN Pelaez-ROMINA  Patient location during procedure: OR    Indications and Patient Condition  Indications for airway management: anesthesia  Spontaneous ventilation: present  Sedation level: deep  Preoxygenated: yes  Patient position: sniffing  Mask difficulty assessment: 0 - not attempted  No planned trial extubation    Final Airway Details  Final airway type: supraglottic airway      Successful airway: classic  Size 4     Number of attempts at approach: 1

## 2024-09-24 ENCOUNTER — EVALUATION (OUTPATIENT)
Dept: OCCUPATIONAL THERAPY | Facility: HOSPITAL | Age: 54
End: 2024-09-24
Payer: COMMERCIAL

## 2024-09-24 DIAGNOSIS — S46.211A BICEPS RUPTURE, DISTAL, RIGHT, INITIAL ENCOUNTER: ICD-10-CM

## 2024-09-24 PROCEDURE — L3702 EO W/O JOINTS CF: HCPCS | Performed by: OCCUPATIONAL THERAPIST

## 2024-09-24 PROCEDURE — 97165 OT EVAL LOW COMPLEX 30 MIN: CPT | Mod: GO | Performed by: OCCUPATIONAL THERAPIST

## 2024-09-24 ASSESSMENT — ENCOUNTER SYMPTOMS
OCCASIONAL FEELINGS OF UNSTEADINESS: 0
LOSS OF SENSATION IN FEET: 0
DEPRESSION: 0

## 2024-09-24 NOTE — PROGRESS NOTES
Occupational Therapy  Occupational Therapy Orthopedic Evaluation    Patient Name: Panchito Alcaraz  MRN: [id number]  Today's Date: 9/24/2024       Insurance:  Visit number: 1   Time:  Time Calculation  Start Time: 1120  Stop Time: 1205  Time Calculation (min): 45 min  OT Evaluation Time Entry  OT Evaluation (Low) Time Entry: 20  OT Therapeutic Procedures Time Entry  Therapeutic Exercise Time Entry: 5       Insurance Type: Payor: ANTHEM / Plan: ANTHEM HMP / Product Type: *No Product type* /     General:  Reason for visit: R elbow  Referred by: Dr. Barajas    Current Problem  1. Biceps rupture, distal, right, initial encounter  Referral to Occupational Therapy          Precautions: per post op protocol       Medical History Form: Reviewed (scanned into chart)    Subjective:   Chief Complaint: R elbow  GUIDO: lifting tv  DOS: 09/18/2024 Right distal biceps reinsertion with Arthrex Endobutton       Hand Dominance: Right    Current Condition since injury:   better      PAIN     Location: anterior arm   Intensity:  0/10 up to 6/10  Description: sharp  Relevant Information (PMH & Previous Tests/Imaging): reviewed in chart       Prior Level of Function (PLOF)  Exercise/Physical Activity: lifting weights, golfing, yard work  Work/School:   Current ADL/IADL Status: independent with home making     Patients Living Environment: Reviewed and no concern    Primary Language: English    Pt goals for therapy: improve functional use of arm for golfing, yard work,     Red Flags: Do you have any of the following? No  Fever/chills, unexplained weight changes, dizziness/fainting, unexplained change in bowel or bladder functions, unexplained malaise or muscle weakness, night pain/sweats, numbness or tingling    Objective:   Passive elbow extension/flexion 40-90   Forearm pronation/supination 35/30  Wrist extension/flexion 50/35  Composite fist    Physical Observation: patient presented in post op dressing, sutures intact, no drainage  noted  Edema: moderate    Numbness/Tingling: no complaints of numbness/tingling    Outcome Measures:  Quick DASH: 50    EDUCATION: home exercise program, plan of care, activity modifications, pain management, and injury pathology       Goals:  Active       OT Goals       OT Goal 1       Start:  09/24/24    Expected End:  11/05/24       Improve elbow extension to 5 degrees for reaching to grasp objects.         OT Goal 2       Start:  09/24/24    Expected End:  11/05/24       Improve forearm pronation and supination to 70 degrees for driving.         OT Goal 3       Start:  09/24/24    Expected End:  12/17/24       Improve Quick DASH to 15%.         OT Goal 4       Start:  09/24/24    Expected End:  12/17/24       Patient to report max pain of 2/10 for golfing.         OT Goal 5       Start:  09/24/24    Expected End:  12/17/24       Patient to improve  strength to 85% of unaffected side for yard work and lifting heavier household objects.  Patient to improve elbow flexion MMT to 5/5 for lifting weights.             Plan of care was developed with input and agreement by the patient.   Good rehab potential.    Treatments:     Low complexity evaluation  20 min       Therapeutic Exercise:   5 min  HEP education: passive elbow flexion, passive elbow extension to 45 degrees, short arm forearm pronation and supination, tenodesis wrist, and shoulder rolls    Orthosis:      20 min   Posterior elbow orthosis with elbow in 60 degrees of flexion, forearm neurtral, and wrist free.  Reviewed protocol with patient today.      Assessment: Patient is a 53 yo male   s/p R distal biceps tendon repair resulting in limited participation in pain-free ADLs and inability to perform at their prior level of function. Pt would benefit from occupational therapy to address the impairments found & listed previously in the objective section in order to return to safe and pain-free ADLs and prior level of function.       Plan: 1      Planned Interventions include: therapeutic exercise, therapeutic activity, self-care home management, manual therapy, therapeutic activities, gait training, neuromuscular coordination, vasopneumatic, dry needling, aquatic therapy, electric stimulation, fluidotherapy, ultrasound, kinesiotaping, orthosis fabrication, wound care  Frequency: 1 x Week  Duration: 12 Weeks      Stevo Serna, OT

## 2024-09-30 ENCOUNTER — TREATMENT (OUTPATIENT)
Dept: OCCUPATIONAL THERAPY | Facility: HOSPITAL | Age: 54
End: 2024-09-30
Payer: COMMERCIAL

## 2024-09-30 DIAGNOSIS — S46.211A BICEPS RUPTURE, DISTAL, RIGHT, INITIAL ENCOUNTER: ICD-10-CM

## 2024-09-30 PROCEDURE — 97140 MANUAL THERAPY 1/> REGIONS: CPT | Mod: GO | Performed by: OCCUPATIONAL THERAPIST

## 2024-09-30 NOTE — PROGRESS NOTES
Occupational Therapy  Occupational Therapy Orthopedic Treatment    Patient Name: Panchito Alcaraz  MRN: [id number]  Today's Date: 9/30/2024       Insurance:  Visit number: 2  Time:   Time:  Time Calculation  Start Time: 1500  Stop Time: 1540  Time Calculation (min): 40 min  OT Therapeutic Procedures Time Entry  Manual Therapy Time Entry: 30  Therapeutic Exercise Time Entry: 5    Insurance Type: Payor: ANTHEM / Plan: ANTHEM HMP / Product Type: *No Product type* /     General:  Reason for visit: R elbow  Referred by: Dr. Barajas    Current Problem  1. Biceps rupture, distal, right, initial encounter  Follow Up In Occupational Therapy          Precautions: per post op protocol       Medical History Form: Reviewed (scanned into chart)    Subjective:  I think it is a little better. The pain is less.  Chief Complaint: R elbow  GUIDO: lifting tv  DOS: 09/18/2024 Right distal biceps reinsertion with Arthrex Endobutton       Hand Dominance: Right    Current Condition since injury:   better      PAIN     Location: anterior arm   Intensity:  0/10 up to 4/10 was 6/10  Description: sharp  Relevant Information (PMH & Previous Tests/Imaging): reviewed in chart       Prior Level of Function (PLOF)  Exercise/Physical Activity: lifting weights, golfing, yard work  Work/School:   Current ADL/IADL Status: independent with home making     Patients Living Environment: Reviewed and no concern    Primary Language: English    Pt goals for therapy: improve functional use of arm for golfing, yard work,     Red Flags: Do you have any of the following? No  Fever/chills, unexplained weight changes, dizziness/fainting, unexplained change in bowel or bladder functions, unexplained malaise or muscle weakness, night pain/sweats, numbness or tingling    Objective:   Passive elbow extension/flexion 35 was  was 90   Forearm pronation/supination 40 was 35/50 was 30  Wrist extension/flexion 60 was 50/65 was 35  Composite fist    Physical  Observation: patient presented in post op dressing, sutures intact, no drainage noted  Edema: moderate    Numbness/Tingling: no complaints of numbness/tingling    Outcome Measures:  Quick DASH: 50    EDUCATION: home exercise program, plan of care, activity modifications, pain management, and injury pathology       Goals:  Active       OT Goals       OT Goal 1       Start:  09/24/24    Expected End:  11/05/24       Improve elbow extension to 5 degrees for reaching to grasp objects.         OT Goal 2       Start:  09/24/24    Expected End:  11/05/24       Improve forearm pronation and supination to 70 degrees for driving.         OT Goal 3       Start:  09/24/24    Expected End:  12/17/24       Improve Quick DASH to 15%.         OT Goal 4       Start:  09/24/24    Expected End:  12/17/24       Patient to report max pain of 2/10 for golfing.         OT Goal 5       Start:  09/24/24    Expected End:  12/17/24       Patient to improve  strength to 85% of unaffected side for yard work and lifting heavier household objects.  Patient to improve elbow flexion MMT to 5/5 for lifting weights.             Plan of care was developed with input and agreement by the patient.   Good rehab potential.    Treatments:     Heating pad applied to circumferential elbow prior to completion of exercises. 5 min       Therapeutic Exercise:   5 min  HEP education: passive elbow flexion, passive elbow extension to 45 degrees, short arm forearm pronation and supination, tenodesis wrist, and shoulder rolls    Manual Therapy    30 min  Therapist performed manual trigger point release along elbow flexors.  Therapist performed manual stretches: elbow flexion, elbow extension, forearm pronation, forearm supination, wrist extension, and wrist flexion.      Assessment:     Patient had improvements with elbow, forearm, and wrist range of motion. Patient doing well overall. Patient reports good compliance with home program and wear of  orthosis.    Plan:    Planned Interventions include: therapeutic exercise, therapeutic activity, self-care home management, manual therapy, therapeutic activities, gait training, neuromuscular coordination, vasopneumatic, dry needling, aquatic therapy, electric stimulation, fluidotherapy, ultrasound, kinesiotaping, orthosis fabrication, wound care  Frequency: 1 x Week  Duration: 12 Weeks      Stevo Serna OT

## 2024-10-03 ENCOUNTER — APPOINTMENT (OUTPATIENT)
Dept: OCCUPATIONAL THERAPY | Facility: HOSPITAL | Age: 54
End: 2024-10-03
Payer: COMMERCIAL

## 2024-10-03 ENCOUNTER — APPOINTMENT (OUTPATIENT)
Dept: ORTHOPEDIC SURGERY | Facility: HOSPITAL | Age: 54
End: 2024-10-03
Payer: COMMERCIAL

## 2024-10-03 ENCOUNTER — OFFICE VISIT (OUTPATIENT)
Dept: ORTHOPEDIC SURGERY | Facility: HOSPITAL | Age: 54
End: 2024-10-03
Payer: COMMERCIAL

## 2024-10-03 DIAGNOSIS — S46.211A BICEPS RUPTURE, DISTAL, RIGHT, INITIAL ENCOUNTER: Primary | ICD-10-CM

## 2024-10-03 PROCEDURE — 1036F TOBACCO NON-USER: CPT | Performed by: ORTHOPAEDIC SURGERY

## 2024-10-03 PROCEDURE — 99211 OFF/OP EST MAY X REQ PHY/QHP: CPT | Performed by: ORTHOPAEDIC SURGERY

## 2024-10-03 ASSESSMENT — PAIN - FUNCTIONAL ASSESSMENT: PAIN_FUNCTIONAL_ASSESSMENT: 0-10

## 2024-10-03 ASSESSMENT — PAIN SCALES - GENERAL: PAINLEVEL_OUTOF10: 0 - NO PAIN

## 2024-10-03 NOTE — PROGRESS NOTES
Reason for Appointment  Chief Complaint   Patient presents with    Right Forearm - Post-op     History of Present Illness  Patient is here today 2 weeks s/p a right distal biceps reinsertion on 9/18/24. Patient is doing well overall.  Wounds are healing nicely with no signs of infection, sutures were removed today.  He has been to therapy and has a custom posterior splint.  He understands postoperative protocol fully with passive elbow motion for the next 4 weeks.  Tendon is intact today.  He understands no heavy lifting with this arm.  We will follow-up with him in 4 weeks.    Assessment   Right distal biceps rupture

## 2024-10-08 ENCOUNTER — TREATMENT (OUTPATIENT)
Dept: OCCUPATIONAL THERAPY | Facility: HOSPITAL | Age: 54
End: 2024-10-08
Payer: COMMERCIAL

## 2024-10-08 DIAGNOSIS — S46.211A BICEPS RUPTURE, DISTAL, RIGHT, INITIAL ENCOUNTER: ICD-10-CM

## 2024-10-08 PROCEDURE — 97110 THERAPEUTIC EXERCISES: CPT | Mod: GO | Performed by: OCCUPATIONAL THERAPIST

## 2024-10-08 PROCEDURE — 97140 MANUAL THERAPY 1/> REGIONS: CPT | Mod: GO | Performed by: OCCUPATIONAL THERAPIST

## 2024-10-08 NOTE — PROGRESS NOTES
Occupational Therapy  Occupational Therapy Orthopedic Treatment    Patient Name: Panchito Alcaraz  MRN: [id number]  Today's Date: 10/8/2024       Insurance:  Visit number: 3  Time:   Time:   Time:  Time Calculation  Start Time: 1100  Stop Time: 1145  Time Calculation (min): 45 min  OT Therapeutic Procedures Time Entry  Manual Therapy Time Entry: 30  Therapeutic Exercise Time Entry: 10      Insurance Type: Payor: ANTHEM / Plan: ANTHEM HMP / Product Type: *No Product type* /     General:  Reason for visit: R elbow  Referred by: Dr. Barajas    Current Problem  1. Biceps rupture, distal, right, initial encounter  Follow Up In Occupational Therapy          Precautions: per post op protocol       Medical History Form: Reviewed (scanned into chart)    Subjective:  I had a little bit of scare this morning. I tripped and fell this morning but I caught most of the weight on my left hand.  Chief Complaint: R elbow  GUIDO: lifting tv  DOS: 09/18/2024 Right distal biceps reinsertion with Arthrex Endobutton     Hand Dominance: Right    Current Condition since injury:   better      PAIN     Location: anterior arm   Intensity:  0/10 up to 4/10   Description: sharp  Relevant Information (PMH & Previous Tests/Imaging): reviewed in chart     Prior Level of Function (PLOF)  Exercise/Physical Activity: lifting weights, golfing, yard work  Work/School:   Current ADL/IADL Status: independent with home making     Patients Living Environment: Reviewed and no concern    Primary Language: English    Pt goals for therapy: improve functional use of arm for golfing, yard work,     Red Flags: Do you have any of the following? No  Fever/chills, unexplained weight changes, dizziness/fainting, unexplained change in bowel or bladder functions, unexplained malaise or muscle weakness, night pain/sweats, numbness or tingling    Objective:   Passive elbow extension/flexion 35  -110    Forearm pronation/supination 40  /50    Wrist extension/flexion 60   /65    Composite fist    Physical Observation: patient presented in post op dressing, sutures intact, no drainage noted  Edema: moderate    Numbness/Tingling: no complaints of numbness/tingling    Outcome Measures:  Quick DASH: 50    EDUCATION: home exercise program, plan of care, activity modifications, pain management, and injury pathology       Goals:  Active       OT Goals       OT Goal 1       Start:  09/24/24    Expected End:  11/05/24       Improve elbow extension to 5 degrees for reaching to grasp objects.         OT Goal 2       Start:  09/24/24    Expected End:  11/05/24       Improve forearm pronation and supination to 70 degrees for driving.         OT Goal 3       Start:  09/24/24    Expected End:  12/17/24       Improve Quick DASH to 15%.         OT Goal 4       Start:  09/24/24    Expected End:  12/17/24       Patient to report max pain of 2/10 for golfing.         OT Goal 5       Start:  09/24/24    Expected End:  12/17/24       Patient to improve  strength to 85% of unaffected side for yard work and lifting heavier household objects.  Patient to improve elbow flexion MMT to 5/5 for lifting weights.             Plan of care was developed with input and agreement by the patient.   Good rehab potential.    Treatments:     Heating pad applied to circumferential elbow prior to completion of exercises. 5 min       Therapeutic Exercise:   10 min  HEP education: passive elbow flexion, passive elbow extension to 45 degrees, short arm forearm pronation and supination, tenodesis wrist, and shoulder rolls  Passive elbow extension with forearm in supination, pronation, and neutral.  Short arc forearm pronation and supination.    Manual Therapy    30 min  Therapist performed manual trigger point release along elbow flexors.  Therapist performed manual stretches: elbow flexion, elbow extension, forearm pronation, forearm supination, wrist extension, and wrist flexion.  Therapist performed manual scar  mobilization.       Assessment:     Continued range of motion exercises today. Patient reports good compliance with range of motion exercises and with orthosis wear schedule. Patient to follow up in two weeks. Reviewed protocol today with passive elbow motion.     Plan:    Planned Interventions include: therapeutic exercise, therapeutic activity, self-care home management, manual therapy, therapeutic activities, gait training, neuromuscular coordination, vasopneumatic, dry needling, aquatic therapy, electric stimulation, fluidotherapy, ultrasound, kinesiotaping, orthosis fabrication, wound care  Frequency: 1 x Week  Duration: 12 Weeks      Stevo Serna OT

## 2024-10-15 ENCOUNTER — APPOINTMENT (OUTPATIENT)
Dept: OCCUPATIONAL THERAPY | Facility: HOSPITAL | Age: 54
End: 2024-10-15
Payer: COMMERCIAL

## 2024-10-22 ENCOUNTER — APPOINTMENT (OUTPATIENT)
Dept: OCCUPATIONAL THERAPY | Facility: HOSPITAL | Age: 54
End: 2024-10-22
Payer: COMMERCIAL

## 2024-10-22 DIAGNOSIS — S46.211A BICEPS RUPTURE, DISTAL, RIGHT, INITIAL ENCOUNTER: ICD-10-CM

## 2024-10-22 PROCEDURE — 97110 THERAPEUTIC EXERCISES: CPT | Mod: GO | Performed by: OCCUPATIONAL THERAPIST

## 2024-10-22 PROCEDURE — 97140 MANUAL THERAPY 1/> REGIONS: CPT | Mod: GO | Performed by: OCCUPATIONAL THERAPIST

## 2024-10-22 NOTE — PROGRESS NOTES
Occupational Therapy  Occupational Therapy Orthopedic Treatment    Patient Name: Panchito Alcaraz  MRN: [id number]  Today's Date: 10/22/2024       Insurance:  Visit number: 4  Time:  Time Calculation  Start Time: 1400  Stop Time: 1445  Time Calculation (min): 45 min  OT Therapeutic Procedures Time Entry  Manual Therapy Time Entry: 30  Therapeutic Exercise Time Entry: 10      Insurance Type: Payor: ANTHEM / Plan: [employer]M HMP / Product Type: *No Product type* /     General:  Reason for visit: R elbow  Referred by: Dr. Barajas    Current Problem  1. Biceps rupture, distal, right, initial encounter  Follow Up In Occupational Therapy          Precautions: per post op protocol       Medical History Form: Reviewed (scanned into chart)    Subjective:  It is feeling better.  Chief Complaint: R elbow  GUIDO: lifting tv  DOS: 09/18/2024 Right distal biceps reinsertion with Arthrex Endobutton     Hand Dominance: Right    Current Condition since injury:   better      PAIN     Location: anterior arm   Intensity:  0/10 up to 4/10   Description: sharp  Relevant Information (PMH & Previous Tests/Imaging): reviewed in chart     Prior Level of Function (PLOF)  Exercise/Physical Activity: lifting weights, golfing, yard work  Work/School:   Current ADL/IADL Status: independent with home making     Patients Living Environment: Reviewed and no concern    Primary Language: English    Pt goals for therapy: improve functional use of arm for golfing, yard work,     Red Flags: Do you have any of the following? No  Fever/chills, unexplained weight changes, dizziness/fainting, unexplained change in bowel or bladder functions, unexplained malaise or muscle weakness, night pain/sweats, numbness or tingling    Objective:   Passive elbow extension/flexion 35  -110    Forearm pronation/supination 40  /50    Wrist extension/flexion 60  /65    Composite fist    Physical Observation: patient presented in post op dressing, sutures intact, no drainage  noted  Edema: moderate    Numbness/Tingling: no complaints of numbness/tingling    Outcome Measures:  Quick DASH: 50    EDUCATION: home exercise program, plan of care, activity modifications, pain management, and injury pathology       Goals:  Active       OT Goals       OT Goal 1       Start:  09/24/24    Expected End:  11/05/24       Improve elbow extension to 5 degrees for reaching to grasp objects.         OT Goal 2       Start:  09/24/24    Expected End:  11/05/24       Improve forearm pronation and supination to 70 degrees for driving.         OT Goal 3       Start:  09/24/24    Expected End:  12/17/24       Improve Quick DASH to 15%.         OT Goal 4       Start:  09/24/24    Expected End:  12/17/24       Patient to report max pain of 2/10 for golfing.         OT Goal 5       Start:  09/24/24    Expected End:  12/17/24       Patient to improve  strength to 85% of unaffected side for yard work and lifting heavier household objects.  Patient to improve elbow flexion MMT to 5/5 for lifting weights.             Plan of care was developed with input and agreement by the patient.   Good rehab potential.    Treatments:     Heating pad applied to circumferential elbow prior to completion of exercises. 5 min       Therapeutic Exercise:   10 min  HEP education: passive elbow flexion, passive elbow extension to 45 degrees, short arm forearm pronation and supination, tenodesis wrist, and shoulder rolls  Passive elbow extension with forearm in supination, pronation, and neutral.  Short arc forearm pronation and supination.    Manual Therapy    30 min  Therapist performed manual trigger point release along elbow flexors.  Therapist performed manual stretches: elbow flexion, elbow extension, forearm pronation, forearm supination, wrist extension, and wrist flexion.  Therapist performed manual scar mobilization.       Assessment:      Continued range of motion exercises today. Patient doing very well overall. Plan to  begin active motion next week. Patient reports good compliance with home program.     Plan:    Planned Interventions include: therapeutic exercise, therapeutic activity, self-care home management, manual therapy, therapeutic activities, gait training, neuromuscular coordination, vasopneumatic, dry needling, aquatic therapy, electric stimulation, fluidotherapy, ultrasound, kinesiotaping, orthosis fabrication, wound care  Frequency: 1 x Week  Duration: 12 Weeks      Stevo Serna, OT

## 2024-10-29 ENCOUNTER — APPOINTMENT (OUTPATIENT)
Dept: OCCUPATIONAL THERAPY | Facility: HOSPITAL | Age: 54
End: 2024-10-29
Payer: COMMERCIAL

## 2024-10-29 DIAGNOSIS — S46.211A BICEPS RUPTURE, DISTAL, RIGHT, INITIAL ENCOUNTER: ICD-10-CM

## 2024-10-29 PROCEDURE — 97140 MANUAL THERAPY 1/> REGIONS: CPT | Mod: GO | Performed by: OCCUPATIONAL THERAPIST

## 2024-10-29 PROCEDURE — 97110 THERAPEUTIC EXERCISES: CPT | Mod: GO | Performed by: OCCUPATIONAL THERAPIST

## 2024-11-07 ENCOUNTER — OFFICE VISIT (OUTPATIENT)
Dept: ORTHOPEDIC SURGERY | Facility: HOSPITAL | Age: 54
End: 2024-11-07
Payer: COMMERCIAL

## 2024-11-07 DIAGNOSIS — S46.211A BICEPS RUPTURE, DISTAL, RIGHT, INITIAL ENCOUNTER: Primary | ICD-10-CM

## 2024-11-07 PROCEDURE — 99211 OFF/OP EST MAY X REQ PHY/QHP: CPT | Performed by: ORTHOPAEDIC SURGERY

## 2024-11-07 PROCEDURE — 1036F TOBACCO NON-USER: CPT | Performed by: ORTHOPAEDIC SURGERY

## 2024-11-07 ASSESSMENT — PAIN SCALES - GENERAL: PAINLEVEL_OUTOF10: 0 - NO PAIN

## 2024-11-07 ASSESSMENT — PAIN - FUNCTIONAL ASSESSMENT: PAIN_FUNCTIONAL_ASSESSMENT: 0-10

## 2024-11-07 NOTE — PROGRESS NOTES
Reason for Appointment  Chief Complaint   Patient presents with    Right Forearm - Post-op, Follow-up     History of Present Illness  Patient is here today 6 weeks s/p a right distal biceps reinsertion. Patient is doing well overall.  With no signs of infection, he has been wearing his splint and doing passive motion.  Biceps tendon is intact.  He will progress to full active elbow motion but he is understands no heavy lifting, pushing, or pulling with this arm.  He can use the arm for simple activities and at about 10 weeks can begin strengthening.  He is being occupational therapy.  We can follow-up with him in 2 months.    Assessment   Right distal biceps rupture

## 2024-11-12 ENCOUNTER — APPOINTMENT (OUTPATIENT)
Dept: OCCUPATIONAL THERAPY | Facility: HOSPITAL | Age: 54
End: 2024-11-12
Payer: COMMERCIAL

## 2024-11-12 DIAGNOSIS — S46.211A BICEPS RUPTURE, DISTAL, RIGHT, INITIAL ENCOUNTER: ICD-10-CM

## 2024-11-12 PROCEDURE — 97110 THERAPEUTIC EXERCISES: CPT | Mod: GO | Performed by: OCCUPATIONAL THERAPIST

## 2024-11-12 PROCEDURE — 97140 MANUAL THERAPY 1/> REGIONS: CPT | Mod: GO | Performed by: OCCUPATIONAL THERAPIST

## 2024-11-12 NOTE — PROGRESS NOTES
Occupational Therapy  Occupational Therapy Orthopedic Treatment    Patient Name: Panchito Alcaraz  MRN: [id number]  Today's Date: 11/12/2024       Insurance:  Visit number: 6  Time:   Time:  Time Calculation  Start Time: 1415  Stop Time: 1458  Time Calculation (min): 43 min  OT Therapeutic Procedures Time Entry  Manual Therapy Time Entry: 15  Therapeutic Exercise Time Entry: 23    Insurance Type: Payor: ANTHEM / Plan: [employer]M HMP / Product Type: *No Product type* /     General:  Reason for visit: R elbow  Referred by: Dr. Barajas    Current Problem  1. Biceps rupture, distal, right, initial encounter  Follow Up In Occupational Therapy          Precautions: per post op protocol       Medical History Form: Reviewed (scanned into chart)    Subjective:   I feel more free being out of the brace. It feels pretty good.   Chief Complaint: R elbow  GUIDO: lifting tv  DOS: 09/18/2024 Right distal biceps reinsertion with Arthrex Endobutton     Hand Dominance: Right    Current Condition since injury:   better      PAIN     Location: anterior arm   Intensity:  0/10 up to 4/10   Description: sharp  Relevant Information (PMH & Previous Tests/Imaging): reviewed in chart     Prior Level of Function (PLOF)  Exercise/Physical Activity: lifting weights, golfing, yard work  Work/School:   Current ADL/IADL Status: independent with home making     Patients Living Environment: Reviewed and no concern    Primary Language: English    Pt goals for therapy: improve functional use of arm for golfing, yard work,     Red Flags: Do you have any of the following? No  Fever/chills, unexplained weight changes, dizziness/fainting, unexplained change in bowel or bladder functions, unexplained malaise or muscle weakness, night pain/sweats, numbness or tingling    Objective:   Passive elbow extension/flexion 5 was 15    -125    Forearm pronation/supination 75 was 70    /65 was 60    Wrist extension/flexion 60  /65    Composite fist    Physical  Observation: patient presented in post op dressing, sutures intact, no drainage noted  Edema: moderate    Numbness/Tingling: no complaints of numbness/tingling    Outcome Measures:  Quick DASH: 50    EDUCATION: home exercise program, plan of care, activity modifications, pain management, and injury pathology       Goals:  Active       OT Goals       OT Goal 1       Start:  09/24/24    Expected End:  11/05/24       Improve elbow extension to 5 degrees for reaching to grasp objects.         OT Goal 2       Start:  09/24/24    Expected End:  11/05/24       Improve forearm pronation and supination to 70 degrees for driving.         OT Goal 3       Start:  09/24/24    Expected End:  12/17/24       Improve Quick DASH to 15%.         OT Goal 4       Start:  09/24/24    Expected End:  12/17/24       Patient to report max pain of 2/10 for golfing.         OT Goal 5       Start:  09/24/24    Expected End:  12/17/24       Patient to improve  strength to 85% of unaffected side for yard work and lifting heavier household objects.  Patient to improve elbow flexion MMT to 5/5 for lifting weights.             Plan of care was developed with input and agreement by the patient.   Good rehab potential.    Treatments:     Heating pad applied to circumferential elbow prior to completion of exercises. 5 min       Therapeutic Exercise:   23 min  HEP education: passive elbow flexion, passive elbow extension to 45 degrees, short arm forearm pronation and supination, tenodesis wrist, and shoulder rolls  Passive elbow extension with forearm in supination, pronation, and neutral.  Forearm pronation and supination.  Wrist flexion and extension stretches.  AAROM wrist extension and flexion on ball.  AAROM forearm pronation and supination on ball.  Wrist maze for improved dynamic wrist/forearm range of motion  Active elbow flexion with forearm neutral, forearm in supination, and forearm in pronation.    Manual Therapy    15 min  Therapist  performed manual trigger point release along elbow flexors.  Therapist performed manual stretches: elbow flexion, elbow extension, forearm pronation, forearm supination, wrist extension, and wrist flexion.  Therapist performed manual scar mobilization.     Assessment:    Continued range of motion exercises today. Patient doing well overall. Patient to follow up in two weeks. Patient reports good compliance with home program.         Plan:    Planned Interventions include: therapeutic exercise, therapeutic activity, self-care home management, manual therapy, therapeutic activities, gait training, neuromuscular coordination, vasopneumatic, dry needling, aquatic therapy, electric stimulation, fluidotherapy, ultrasound, kinesiotaping, orthosis fabrication, wound care  Frequency: 1 x Week  Duration: 12 Weeks      Stevo Serna, OT

## 2024-11-25 ENCOUNTER — TREATMENT (OUTPATIENT)
Dept: OCCUPATIONAL THERAPY | Facility: HOSPITAL | Age: 54
End: 2024-11-25
Payer: COMMERCIAL

## 2024-11-25 DIAGNOSIS — S46.211A BICEPS RUPTURE, DISTAL, RIGHT, INITIAL ENCOUNTER: ICD-10-CM

## 2024-11-25 PROCEDURE — 97140 MANUAL THERAPY 1/> REGIONS: CPT | Mod: GO | Performed by: OCCUPATIONAL THERAPIST

## 2024-11-25 PROCEDURE — 97110 THERAPEUTIC EXERCISES: CPT | Mod: GO | Performed by: OCCUPATIONAL THERAPIST

## 2024-11-25 NOTE — PROGRESS NOTES
Occupational Therapy  Occupational Therapy Orthopedic Treatment    Patient Name: Panchito Alcaraz  MRN: [id number]  Today's Date: 11/25/2024       Insurance:  Visit number: 7  Time:  Time Calculation  Start Time: 1415  Stop Time: 1500  Time Calculation (min): 45 min  OT Therapeutic Procedures Time Entry  Manual Therapy Time Entry: 15  Therapeutic Exercise Time Entry: 25      Insurance Type: Payor: ANTHEM / Plan: ANTHEM HMP / Product Type: *No Product type* /     General:  Reason for visit: R elbow  Referred by: Dr. Barajas    Current Problem  1. Biceps rupture, distal, right, initial encounter  Follow Up In Occupational Therapy          Precautions: per post op protocol       Medical History Form: Reviewed (scanned into chart)    Subjective:   Since not wearing the brace as much I would say it is a little more tender.     Chief Complaint: R elbow  GUIDO: lifting tv  DOS: 09/18/2024 Right distal biceps reinsertion with Arthrex Endobutton     Hand Dominance: Right    Current Condition since injury:   better      PAIN     Location: anterior arm   Intensity:  0/10 up to 4/10   Description: sharp  Relevant Information (PMH & Previous Tests/Imaging): reviewed in chart     Prior Level of Function (PLOF)  Exercise/Physical Activity: lifting weights, golfing, yard work  Work/School:   Current ADL/IADL Status: independent with home making     Patients Living Environment: Reviewed and no concern    Primary Language: English    Pt goals for therapy: improve functional use of arm for golfing, yard work,     Red Flags: Do you have any of the following? No  Fever/chills, unexplained weight changes, dizziness/fainting, unexplained change in bowel or bladder functions, unexplained malaise or muscle weakness, night pain/sweats, numbness or tingling    Objective:   Passive elbow extension/flexion 5   -125    Forearm pronation/supination 75 /65     Wrist extension/flexion 60  /65    Composite fist  R/L  strength:  60/115#  Physical Observation: patient presented in post op dressing, sutures intact, no drainage noted  Edema: moderate    Numbness/Tingling: no complaints of numbness/tingling    Outcome Measures:  Quick DASH: 50    EDUCATION: home exercise program, plan of care, activity modifications, pain management, and injury pathology       Goals:  Active       OT Goals       OT Goal 1       Start:  09/24/24    Expected End:  11/05/24       Improve elbow extension to 5 degrees for reaching to grasp objects.         OT Goal 2       Start:  09/24/24    Expected End:  11/05/24       Improve forearm pronation and supination to 70 degrees for driving.         OT Goal 3       Start:  09/24/24    Expected End:  12/17/24       Improve Quick DASH to 15%.         OT Goal 4       Start:  09/24/24    Expected End:  12/17/24       Patient to report max pain of 2/10 for golfing.         OT Goal 5       Start:  09/24/24    Expected End:  12/17/24       Patient to improve  strength to 85% of unaffected side for yard work and lifting heavier household objects.  Patient to improve elbow flexion MMT to 5/5 for lifting weights.             Plan of care was developed with input and agreement by the patient.   Good rehab potential.    Treatments:     Heating pad applied to circumferential elbow prior to completion of exercises. 5 min       Therapeutic Exercise:   25 min  HEP education: passive elbow flexion, passive elbow extension to 45 degrees, short arm forearm pronation and supination, tenodesis wrist, and shoulder rolls  Passive elbow extension with forearm in supination, pronation, and neutral.  Forearm pronation and supination.  Wrist flexion and extension stretches.  AAROM wrist extension and flexion on ball.  AAROM forearm pronation and supination on ball.  Wrist maze for improved dynamic wrist/forearm range of motion  Active elbow flexion with forearm neutral, forearm in supination, and forearm in pronation.    Light resistance  only  Initiated isometrics:  elbow flexion with forearm neutral, elbow flexion with forearm in pronation, and forearm pronation. Green putty for grasping.  Patient to begin isometric elbow flexion in supination, and forearm supination Wednesday.    Manual Therapy    15 min  Therapist performed manual trigger point release along elbow flexors.  Therapist performed manual stretches: elbow flexion, elbow extension, forearm pronation, forearm supination, wrist extension, and wrist flexion.  Therapist performed manual scar mobilization.     Assessment:     Progressed home program. Patient educated on light isometrics. Patient reports good compliance with home program. Patient to follow up in two weeks.        Plan:    Planned Interventions include: therapeutic exercise, therapeutic activity, self-care home management, manual therapy, therapeutic activities, gait training, neuromuscular coordination, vasopneumatic, dry needling, aquatic therapy, electric stimulation, fluidotherapy, ultrasound, kinesiotaping, orthosis fabrication, wound care  Frequency: 1 x Week  Duration: 12 Weeks      Stevo Serna, OT   Modified Advancement Flap Text: The defect edges were debeveled with a #15c scalpel blade.  Given the location of the defect, shape of the defect and the proximity to free margins a modified advancement flap was deemed most appropriate.  Using a sterile surgical marker, an appropriate advancement flap was drawn incorporating the defect and placing the expected incisions within the relaxed skin tension lines where possible.    The area thus outlined was incised deep to adipose tissue with a #15 scalpel blade.  The skin margins were undermined to an appropriate distance in all directions utilizing iris scissors.

## 2024-12-09 ENCOUNTER — APPOINTMENT (OUTPATIENT)
Dept: OCCUPATIONAL THERAPY | Facility: HOSPITAL | Age: 54
End: 2024-12-09
Payer: COMMERCIAL

## 2024-12-10 ENCOUNTER — APPOINTMENT (OUTPATIENT)
Dept: OCCUPATIONAL THERAPY | Facility: HOSPITAL | Age: 54
End: 2024-12-10
Payer: COMMERCIAL

## 2024-12-10 ENCOUNTER — TREATMENT (OUTPATIENT)
Dept: OCCUPATIONAL THERAPY | Facility: HOSPITAL | Age: 54
End: 2024-12-10
Payer: COMMERCIAL

## 2024-12-10 DIAGNOSIS — S46.211A BICEPS RUPTURE, DISTAL, RIGHT, INITIAL ENCOUNTER: ICD-10-CM

## 2024-12-10 PROCEDURE — 97140 MANUAL THERAPY 1/> REGIONS: CPT | Mod: GO | Performed by: OCCUPATIONAL THERAPIST

## 2024-12-10 PROCEDURE — 97110 THERAPEUTIC EXERCISES: CPT | Mod: GO | Performed by: OCCUPATIONAL THERAPIST

## 2024-12-10 NOTE — PROGRESS NOTES
Occupational Therapy  Occupational Therapy Orthopedic Progress Note    Patient Name: Panchito Alcaraz  MRN: [id number]  Today's Date: 12/10/2024       Insurance:  Visit number: 8  Time:   Time:  Time Calculation  Start Time: 1315  Stop Time: 1400  Time Calculation (min): 45 min  OT Therapeutic Procedures Time Entry  Manual Therapy Time Entry: 10  Therapeutic Exercise Time Entry: 30    Insurance Type: Payor: ANTHEM / Plan: ANTHEM HMP / Product Type: *No Product type* /     General:  Reason for visit: R elbow  Referred by: Dr. Barajas    Current Problem  1. Biceps rupture, distal, right, initial encounter  Follow Up In Occupational Therapy          Precautions: per post op protocol       Medical History Form: Reviewed (scanned into chart)    Subjective:   Since not wearing the brace as much I would say it is a little more tender.     Chief Complaint: R elbow  GUIDO: lifting tv  DOS: 09/18/2024 Right distal biceps reinsertion with Arthrex Endobutton     Hand Dominance: Right    Current Condition since injury:   better      PAIN     Location: anterior arm   Intensity:  0/10 up to 4/10   Description: sharp  Relevant Information (PMH & Previous Tests/Imaging): reviewed in chart     Prior Level of Function (PLOF)  Exercise/Physical Activity: lifting weights, golfing, yard work  Work/School:   Current ADL/IADL Status: independent with home making     Patients Living Environment: Reviewed and no concern    Primary Language: English    Pt goals for therapy: improve functional use of arm for golfing, yard work,     Red Flags: Do you have any of the following? No  Fever/chills, unexplained weight changes, dizziness/fainting, unexplained change in bowel or bladder functions, unexplained malaise or muscle weakness, night pain/sweats, numbness or tingling    Objective:   Passive elbow extension/flexion 5   -125    Forearm pronation/supination 75 /65     Wrist extension/flexion 60  /65    Composite fist  R/L  strength:  60/115#  Physical Observation: patient presented in post op dressing, sutures intact, no drainage noted  Edema: moderate    Numbness/Tingling: no complaints of numbness/tingling    Outcome Measures:  Quick DASH: 50    EDUCATION: home exercise program, plan of care, activity modifications, pain management, and injury pathology       Goals:  Active       OT Goals       OT Goal 1 (Met)       Start:  09/24/24    Expected End:  11/05/24    Resolved:  12/10/24    Improve elbow extension to 5 degrees for reaching to grasp objects.         OT Goal 2 (Progressing)       Start:  09/24/24    Expected End:  12/31/24       Improve forearm pronation and supination to 70 degrees for driving.         OT Goal 3 (Progressing)       Start:  09/24/24    Expected End:  01/20/25       Improve Quick DASH to 15%.         OT Goal 4 (Progressing)       Start:  09/24/24    Expected End:  01/20/25       Patient to report max pain of 2/10 for golfing.         OT Goal 5 (Progressing)       Start:  09/24/24    Expected End:  01/20/25       Patient to improve  strength to 85% of unaffected side for yard work and lifting heavier household objects.  Patient to improve elbow flexion MMT to 5/5 for lifting weights.             Plan of care was developed with input and agreement by the patient.   Good rehab potential.    Treatments:     Heating pad applied to circumferential elbow prior to completion of exercises. 5 min       Therapeutic Exercise:   30 min  HEP education: passive elbow flexion, passive elbow extension to 45 degrees, short arm forearm pronation and supination, tenodesis wrist, and shoulder rolls  Passive elbow extension with forearm in supination, pronation, and neutral.  Forearm pronation and supination.  Wrist flexion and extension stretches.  AAROM wrist extension and flexion on ball.  AAROM forearm pronation and supination on ball.  Wrist maze for improved dynamic wrist/forearm range of motion  Active elbow flexion with forearm  neutral, forearm in supination, and forearm in pronation.  Forearm pronation and supination with flexbar.  Wrist extension and flexion with flexbar.  Added theraband strengthening: rows, elbow extension, shoulder extension, IR, and ER.    Continue with isometrics  Initiated isometrics:  elbow flexion with forearm neutral, elbow flexion with forearm in pronation, and forearm pronation. Green putty for grasping.  Patient to begin isometric elbow flexion in supination, and forearm supination Wednesday.    Manual Therapy    10 min  Therapist performed manual trigger point release along elbow flexors.  Therapist performed manual stretches: elbow flexion, elbow extension, forearm pronation, forearm supination, wrist extension, and wrist flexion.  Therapist performed manual scar mobilization.     Assessment:      Progressed exercises. Patient doing very well overall. Patient reports good compliance with home program. Patient to continue limiting lifting with R arm. Plan to advance strengthening next visit. Quick DASH score updated and improved. Patient would benefit from continued therapy services to progress functional use of arm.    Plan:    Planned Interventions include: therapeutic exercise, therapeutic activity, self-care home management, manual therapy, therapeutic activities, gait training, neuromuscular coordination, vasopneumatic, dry needling, aquatic therapy, electric stimulation, fluidotherapy, ultrasound, kinesiotaping, orthosis fabrication, wound care  Frequency: 1 x Week  Duration: 12 Weeks      Stevo Serna OT

## 2024-12-30 ENCOUNTER — TREATMENT (OUTPATIENT)
Dept: OCCUPATIONAL THERAPY | Facility: HOSPITAL | Age: 54
End: 2024-12-30
Payer: COMMERCIAL

## 2024-12-30 DIAGNOSIS — S46.211A BICEPS RUPTURE, DISTAL, RIGHT, INITIAL ENCOUNTER: ICD-10-CM

## 2024-12-30 PROCEDURE — 97110 THERAPEUTIC EXERCISES: CPT | Mod: GO | Performed by: OCCUPATIONAL THERAPIST

## 2024-12-30 NOTE — PROGRESS NOTES
Occupational Therapy  Occupational Therapy Orthopedic Discharge Note    Patient Name: Panchito Alcaraz  MRN: [id number]  Today's Date: 12/30/2024       Insurance:  Visit number: 9  Time:  Time Calculation  Start Time: 1415  Stop Time: 1500  Time Calculation (min): 45 min  OT Therapeutic Procedures Time Entry  Manual Therapy Time Entry: 5  Therapeutic Exercise Time Entry: 35    Insurance Type: Payor: ANTHEM / Plan: ANTHEM HMP / Product Type: *No Product type* /     General:  Reason for visit: R elbow  Referred by: Dr. Barajas    Current Problem  1. Biceps rupture, distal, right, initial encounter  Follow Up In Occupational Therapy          Precautions: per post op protocol       Medical History Form: Reviewed (scanned into chart)    Subjective:  I lifted a stereo the other day and even with that I am favoring. I am watching how heavy that I am lifting.    Chief Complaint: R elbow  GUIDO: lifting tv  DOS: 09/18/2024 Right distal biceps reinsertion with Arthrex Endobutton     Hand Dominance: Right    Current Condition since injury:   better      PAIN     Location: anterior arm   Intensity:  0/10 up to 1/10 was 4/10   Description: sharp  Relevant Information (PMH & Previous Tests/Imaging): reviewed in chart     Prior Level of Function (PLOF)  Exercise/Physical Activity: lifting weights, golfing, yard work  Work/School:   Current ADL/IADL Status: independent with home making     Patients Living Environment: Reviewed and no concern    Primary Language: English    Pt goals for therapy: improve functional use of arm for golfing, yard work,     Red Flags: Do you have any of the following? No  Fever/chills, unexplained weight changes, dizziness/fainting, unexplained change in bowel or bladder functions, unexplained malaise or muscle weakness, night pain/sweats, numbness or tingling    Objective:   Passive elbow extension/flexion 5   -125    Forearm pronation/supination 75 /65     Wrist extension/flexion 60  /65    Composite  fist  R/L  strength: 95# was 60/115#  Physical Observation: patient presented in post op dressing, sutures intact, no drainage noted  Edema: moderate    Numbness/Tingling: no complaints of numbness/tingling    Outcome Measures:  Quick DASH: 50    EDUCATION: home exercise program, plan of care, activity modifications, pain management, and injury pathology       Goals:  Active       OT Goals       OT Goal 1 (Met)       Start:  09/24/24    Expected End:  11/05/24    Resolved:  12/10/24    Improve elbow extension to 5 degrees for reaching to grasp objects.         OT Goal 2 (Met)       Start:  09/24/24    Expected End:  12/31/24    Resolved:  12/30/24    Improve forearm pronation and supination to 70 degrees for driving.         OT Goal 3 (Met)       Start:  09/24/24    Expected End:  01/20/25    Resolved:  12/30/24    Improve Quick DASH to 15%.         OT Goal 4 (Met)       Start:  09/24/24    Expected End:  01/20/25    Resolved:  12/30/24    Patient to report max pain of 2/10 for golfing.         OT Goal 5 (Progressing)       Start:  09/24/24    Expected End:  01/20/25       Patient to improve  strength to 85% of unaffected side for yard work and lifting heavier household objects.  Patient to improve elbow flexion MMT to 5/5 for lifting weights.             Plan of care was developed with input and agreement by the patient.   Good rehab potential.    Treatments:     Heating pad applied to circumferential elbow prior to completion of exercises. 5 min       Therapeutic Exercise:   35 min  HEP education: passive elbow flexion, passive elbow extension to 45 degrees, short arm forearm pronation and supination, tenodesis wrist, and shoulder rolls  Passive elbow extension with forearm in supination, pronation, and neutral.  Forearm pronation and supination.  Wrist flexion and extension stretches.  AAROM wrist extension and flexion on ball.  AAROM forearm pronation and supination on ball.  Wrist maze for improved  dynamic wrist/forearm range of motion  Forearm pronation and supination with flexbar.  Wrist extension and flexion with flexbar.  Theraband strengthening: rows, elbow extension, shoulder extension, IR, and ER. elbow flexion with forearm neutral, elbow flexion with forearm in pronation, and forearm pronation.       Manual Therapy   5 min  Therapist performed manual trigger point release along elbow flexors.  Therapist performed manual stretches: elbow flexion, elbow extension, forearm pronation, forearm supination, wrist extension, and wrist flexion.  Therapist performed manual scar mobilization.     Assessment:    Patient doing well overall. Advanced strengthening. Patient discharged to home program. Patient to follow up with any further questions or concerns.       Plan:    Planned Interventions include: therapeutic exercise, therapeutic activity, self-care home management, manual therapy, therapeutic activities, gait training, neuromuscular coordination, vasopneumatic, dry needling, aquatic therapy, electric stimulation, fluidotherapy, ultrasound, kinesiotaping, orthosis fabrication, wound care  Frequency: 1 x Week  Duration: 12 Weeks      Stevo Serna, OT

## 2025-01-13 ENCOUNTER — APPOINTMENT (OUTPATIENT)
Dept: ORTHOPEDIC SURGERY | Facility: CLINIC | Age: 55
End: 2025-01-13
Payer: COMMERCIAL

## 2025-01-13 DIAGNOSIS — S46.211A BICEPS RUPTURE, DISTAL, RIGHT, INITIAL ENCOUNTER: Primary | ICD-10-CM

## 2025-01-13 PROCEDURE — 99213 OFFICE O/P EST LOW 20 MIN: CPT | Performed by: ORTHOPAEDIC SURGERY

## 2025-01-13 PROCEDURE — 1036F TOBACCO NON-USER: CPT | Performed by: ORTHOPAEDIC SURGERY

## 2025-01-13 ASSESSMENT — PAIN - FUNCTIONAL ASSESSMENT: PAIN_FUNCTIONAL_ASSESSMENT: 0-10

## 2025-01-13 ASSESSMENT — ENCOUNTER SYMPTOMS
CHILLS: 0
BRUISES/BLEEDS EASILY: 0
WHEEZING: 0
SHORTNESS OF BREATH: 0
FATIGUE: 0
FEVER: 0

## 2025-01-13 ASSESSMENT — PAIN SCALES - GENERAL: PAINLEVEL_OUTOF10: 1

## 2025-01-13 NOTE — PROGRESS NOTES
Reason for Appointment  Chief Complaint   Patient presents with    Right Forearm - Follow-up, Post-op     History of Present Illness  Patient is a 54 y.o. male here today for follow-up evaluation of right biceps, almost 4 months out from a right distal biceps reinsertion on 9/18/24. We last saw the patient on 11/7/24 when he was 6 weeks out and we discussed no heavy lifting, pushing, or pulling with this arm. Today he reports no numbness. He just started strengthening. Overall he is doing good. No recent falls or injuries. No other changes in past medical history, allergies, or medications.      Past Medical History:   Diagnosis Date    HTN (hypertension)        Past Surgical History:   Procedure Laterality Date    HERNIA REPAIR         Medication Documentation Review Audit       Reviewed by Veronica Lott MA (Medical Assistant) on 01/13/25 at 1417      Medication Order Taking? Sig Documenting Provider Last Dose Status   ascorbic acid (Vitamin C) 1,000 mg tablet [id number] Yes Take 1 tablet (1,000 mg) by mouth once daily. Historical Provider, MD Taking Active   calcium fructoborate (MOVE FREE ULTRA FASTER COMFORT ORAL) [id number] Yes Take 1 tablet by mouth once daily. Historical Provider, MD Taking Active   multivitamin tablet [id number] Yes Take 1 tablet by mouth once daily. Historical Provider, MD Taking Active                    No Known Allergies    Review of Systems   Constitutional:  Negative for chills, fatigue and fever.   Respiratory:  Negative for shortness of breath and wheezing.    Cardiovascular:  Negative for chest pain and leg swelling.   Allergic/Immunologic: Negative for immunocompromised state.   Hematological:  Does not bruise/bleed easily.       Exam   Full rom good distal biceps tendon palpable . No scar adhesion. Good pulses and sensation.  Full elbow range of motion no numbness down the forearm    Assessment   Right distal biceps rupture     Plan     We discussed being careful with heavy  lifting. We discussed avoiding heavy torquing and twisting. We dicussed at home exercises he can do, but he will gain most of his strength back with ADL's. He can follow up with us in 3 months.       I, Mary Alice Monge, attest that this documentation has been prepared under the direction and in the presence of Jose Barajas MD.   By signing below, I, Jose Barajas MD, personally performed the services described in this documentation. All medical record entries made by the scribe were at my direction and in my presence. I have reviewed the chart and agree that the record reflects my personal performance and is accurate and complete.

## 2025-02-10 ENCOUNTER — OFFICE VISIT (OUTPATIENT)
Dept: PRIMARY CARE | Facility: CLINIC | Age: 55
End: 2025-02-10
Payer: COMMERCIAL

## 2025-02-10 VITALS
HEART RATE: 82 BPM | DIASTOLIC BLOOD PRESSURE: 100 MMHG | BODY MASS INDEX: 32.96 KG/M2 | WEIGHT: 235.4 LBS | SYSTOLIC BLOOD PRESSURE: 180 MMHG | OXYGEN SATURATION: 98 % | HEIGHT: 71 IN | RESPIRATION RATE: 16 BRPM

## 2025-02-10 DIAGNOSIS — F43.9 STRESS: ICD-10-CM

## 2025-02-10 DIAGNOSIS — Z12.5 SCREENING PSA (PROSTATE SPECIFIC ANTIGEN): ICD-10-CM

## 2025-02-10 DIAGNOSIS — S46.211S BICEPS RUPTURE, DISTAL, RIGHT, SEQUELA: Primary | ICD-10-CM

## 2025-02-10 DIAGNOSIS — E66.811 CLASS 1 OBESITY WITHOUT SERIOUS COMORBIDITY WITH BODY MASS INDEX (BMI) OF 32.0 TO 32.9 IN ADULT, UNSPECIFIED OBESITY TYPE: ICD-10-CM

## 2025-02-10 DIAGNOSIS — R73.9 HYPERGLYCEMIA: ICD-10-CM

## 2025-02-10 DIAGNOSIS — I10 PRIMARY HYPERTENSION: ICD-10-CM

## 2025-02-10 PROCEDURE — 3080F DIAST BP >= 90 MM HG: CPT | Performed by: PHYSICIAN ASSISTANT

## 2025-02-10 PROCEDURE — 3077F SYST BP >= 140 MM HG: CPT | Performed by: PHYSICIAN ASSISTANT

## 2025-02-10 PROCEDURE — 1036F TOBACCO NON-USER: CPT | Performed by: PHYSICIAN ASSISTANT

## 2025-02-10 PROCEDURE — 3008F BODY MASS INDEX DOCD: CPT | Performed by: PHYSICIAN ASSISTANT

## 2025-02-10 PROCEDURE — 99203 OFFICE O/P NEW LOW 30 MIN: CPT | Performed by: PHYSICIAN ASSISTANT

## 2025-02-10 RX ORDER — METOPROLOL SUCCINATE 25 MG/1
25 TABLET, EXTENDED RELEASE ORAL DAILY
Qty: 30 TABLET | Refills: 1 | Status: SHIPPED | OUTPATIENT
Start: 2025-02-10 | End: 2025-04-11

## 2025-02-10 ASSESSMENT — PAIN SCALES - GENERAL: PAINLEVEL_OUTOF10: 2

## 2025-02-10 ASSESSMENT — ENCOUNTER SYMPTOMS
DEPRESSION: 0
LOSS OF SENSATION IN FEET: 0
OCCASIONAL FEELINGS OF UNSTEADINESS: 0

## 2025-02-10 ASSESSMENT — PATIENT HEALTH QUESTIONNAIRE - PHQ9
SUM OF ALL RESPONSES TO PHQ9 QUESTIONS 1 AND 2: 0
1. LITTLE INTEREST OR PLEASURE IN DOING THINGS: NOT AT ALL
2. FEELING DOWN, DEPRESSED OR HOPELESS: NOT AT ALL

## 2025-02-10 ASSESSMENT — COLUMBIA-SUICIDE SEVERITY RATING SCALE - C-SSRS
6. HAVE YOU EVER DONE ANYTHING, STARTED TO DO ANYTHING, OR PREPARED TO DO ANYTHING TO END YOUR LIFE?: NO
2. HAVE YOU ACTUALLY HAD ANY THOUGHTS OF KILLING YOURSELF?: NO
1. IN THE PAST MONTH, HAVE YOU WISHED YOU WERE DEAD OR WISHED YOU COULD GO TO SLEEP AND NOT WAKE UP?: NO

## 2025-02-10 NOTE — PROGRESS NOTES
"Subjective   Patient ID:   Panchito Alcaraz \"Alexis\" is a 54 y.o. male who presents for Establish Care.  HPI  New patient here today to establish care with myself.  Last PCP: N/A  Last seen:    HTN:  BP today is 180/100.  States he is under a lot of stress with his job right now.  Had been on BP medication in the past.  Had been on Lisinopril in the past, but maybe had a cough with this.  Denies dizziness, headaches.    Biceps rupture:  Happened in Sep 2024.  Had surgery on this.  Seeing ortho.  States this has healed well.    Hyperglycemia:  Glucose was 111 in Sep 2024.  DUE for A1C.    Health maintenance:  Smoking: Never a smoker.  PSA (50+): DUE  Labs: Sep 2024. DUE for PSA, lipid, A1C  Colonoscopy (50-75): DUE  Influenza:    Review of Systems  12 point review of systems negative unless stated above in HPI    Vitals:    02/10/25 1350   BP: (!) 180/100   Pulse: 82   Resp: 16   SpO2: 98%     Physical Exam  General: Alert and oriented, well nourished, no acute distress.  Lungs: Clear to auscultation, non-labored respiration.  Heart: Normal rate, regular rhythm, no murmur, gallop or edema.  Neurologic: Awake, alert, and oriented X3, CN II-XII intact.  Psychiatric: Cooperative, appropriate mood and affect.    Assessment/Plan   It was nice meeting you!  BP is certainly high today.  I have sent in Metoprolol to start for BP and for stress.  Consider medication specific for anxiety/stress next visit.  I have ordered some labs to be done as soon as you can.  We will call you with the results.  Continue specialty care.  If symptoms persist or worsen despite current plan of care, please contact your healthcare provider for further evaluation.  Patient instructed to contact the office if there are any questions regarding their care or treatment.   [facility] [phone]    Fu 1 month for BP  Diagnoses and all orders for this visit:  Biceps rupture, distal, right, sequela  Hyperglycemia  -     Hemoglobin A1C; " Future  -     Lipid Panel; Future  Screening PSA (prostate specific antigen)  -     Prostate Specific Antigen, Screen; Future  Primary hypertension  -     metoprolol succinate XL (Toprol-XL) 25 mg 24 hr tablet; Take 1 tablet (25 mg) by mouth once daily. Do not crush or chew.  Stress  -     metoprolol succinate XL (Toprol-XL) 25 mg 24 hr tablet; Take 1 tablet (25 mg) by mouth once daily. Do not crush or chew.  BMI 32.0-32.9,adult  Class 1 obesity without serious comorbidity with body mass index (BMI) of 32.0 to 32.9 in adult, unspecified obesity type

## 2025-02-11 LAB
CHOLEST SERPL-MCNC: 215 MG/DL
CHOLEST/HDLC SERPL: 5.2 (CALC)
EST. AVERAGE GLUCOSE BLD GHB EST-MCNC: 123 MG/DL
EST. AVERAGE GLUCOSE BLD GHB EST-SCNC: 6.8 MMOL/L
HBA1C MFR BLD: 5.9 % OF TOTAL HGB
HDLC SERPL-MCNC: 41 MG/DL
LDLC SERPL CALC-MCNC: 149 MG/DL (CALC)
NONHDLC SERPL-MCNC: 174 MG/DL (CALC)
PSA SERPL-MCNC: 0.33 NG/ML
TRIGL SERPL-MCNC: 123 MG/DL

## 2025-02-11 NOTE — RESULT ENCOUNTER NOTE
Prediabetes based on A1C - diet and exercise for now. Cholesterol is borderline needing medication. Work on diet and exercise for now as well. Normal PSA

## 2025-03-03 PROBLEM — R73.03 PREDIABETES: Status: ACTIVE | Noted: 2025-03-03

## 2025-03-03 PROBLEM — E78.00 PURE HYPERCHOLESTEROLEMIA: Status: ACTIVE | Noted: 2025-03-03

## 2025-03-03 NOTE — PROGRESS NOTES
"Subjective   Patient ID:   Panchito Alcaraz \"Alexis\" is a 54 y.o. male who presents for No chief complaint on file..  HPI  HTN:  We started Metoprolol last visit.  I am hoping this will help with stress as well.  BP today is   States he is under a lot of stress with his job right now.  Had been on BP medication in the past.  Had been on Lisinopril in the past, but maybe had a cough with this.  Denies dizziness, headaches.    Biceps rupture:  Happened in Sep 2024.  Had surgery on this.  Seeing ortho.  States this has healed well.    Hyperglycemia/Prediabetes:  Glucose was 111 in Sep 2024.  A1C was 5.9 in Feb 2025.    HLD:  Not on medication for this.  Last checked in Feb 2025 and was borderline.    Health maintenance:  Smoking: Never a smoker.  PSA (50+): Feb 2025.  Labs: Sep 2024.   Colonoscopy (50-75): DUE  Influenza:    Review of Systems  12 point review of systems negative unless stated above in HPI    There were no vitals filed for this visit.    Physical Exam  General: Alert and oriented, well nourished, no acute distress.  Lungs: Clear to auscultation, non-labored respiration.  Heart: Normal rate, regular rhythm, no murmur, gallop or edema.  Neurologic: Awake, alert, and oriented X3, CN II-XII intact.  Psychiatric: Cooperative, appropriate mood and affect.    Assessment/Plan     Diagnoses and all orders for this visit:  Biceps rupture, distal, right, sequela  Hyperglycemia  Primary hypertension  Stress  Prediabetes  Pure hypercholesterolemia    "

## 2025-03-11 ENCOUNTER — APPOINTMENT (OUTPATIENT)
Dept: PRIMARY CARE | Facility: CLINIC | Age: 55
End: 2025-03-11
Payer: COMMERCIAL

## 2025-03-11 DIAGNOSIS — R73.03 PREDIABETES: ICD-10-CM

## 2025-03-11 DIAGNOSIS — I10 PRIMARY HYPERTENSION: ICD-10-CM

## 2025-03-11 DIAGNOSIS — E78.00 PURE HYPERCHOLESTEROLEMIA: ICD-10-CM

## 2025-03-11 DIAGNOSIS — S46.211S BICEPS RUPTURE, DISTAL, RIGHT, SEQUELA: Primary | ICD-10-CM

## 2025-03-11 DIAGNOSIS — F43.9 STRESS: ICD-10-CM

## 2025-03-11 DIAGNOSIS — R73.9 HYPERGLYCEMIA: ICD-10-CM

## 2025-03-12 ENCOUNTER — APPOINTMENT (OUTPATIENT)
Dept: GASTROENTEROLOGY | Facility: CLINIC | Age: 55
End: 2025-03-12
Payer: COMMERCIAL

## 2025-03-17 PROBLEM — S46.211S: Status: ACTIVE | Noted: 2025-03-17

## 2025-03-17 NOTE — PROGRESS NOTES
"Subjective   Patient ID:   Panchito Alcaraz \"Alexis\" is a 54 y.o. male who presents for No chief complaint on file..  HPI  HTN:  We started Metoprolol last visit.  I am hoping this will help with stress as well.  BP today is   States he is under a lot of stress with his job right now.  Had been on BP medication in the past.  Had been on Lisinopril in the past, but maybe had a cough with this.  Denies dizziness, headaches.    Biceps rupture:  Happened in Sep 2024.  Had surgery on this.  Seeing ortho.  States this has healed well.    Hyperglycemia/Prediabetes:  Glucose was 111 in Sep 2024.  A1C was 5.9 in Feb 2025.    HLD:  Not on medication for this.  Last checked in Feb 2025 and was borderline.    Health maintenance:  Smoking: Never a smoker.  PSA (50+): Feb 2025.  Labs: Sep 2024.   Colonoscopy (50-75): DUE  Influenza:    Review of Systems  12 point review of systems negative unless stated above in HPI    There were no vitals filed for this visit.    Physical Exam  General: Alert and oriented, well nourished, no acute distress.  Lungs: Clear to auscultation, non-labored respiration.  Heart: Normal rate, regular rhythm, no murmur, gallop or edema.  Neurologic: Awake, alert, and oriented X3, CN II-XII intact.  Psychiatric: Cooperative, appropriate mood and affect.    Assessment/Plan     Diagnoses and all orders for this visit:  Primary hypertension  Biceps rupture, distal, right, sequela  Hyperglycemia  Prediabetes  Pure hypercholesterolemia    "

## 2025-03-21 ENCOUNTER — APPOINTMENT (OUTPATIENT)
Dept: PRIMARY CARE | Facility: CLINIC | Age: 55
End: 2025-03-21
Payer: COMMERCIAL

## 2025-03-27 ENCOUNTER — APPOINTMENT (OUTPATIENT)
Dept: PRIMARY CARE | Facility: CLINIC | Age: 55
End: 2025-03-27
Payer: COMMERCIAL

## 2025-03-27 DIAGNOSIS — S46.211S BICEPS RUPTURE, DISTAL, RIGHT, SEQUELA: ICD-10-CM

## 2025-03-27 DIAGNOSIS — R73.9 HYPERGLYCEMIA: ICD-10-CM

## 2025-03-27 DIAGNOSIS — E78.00 PURE HYPERCHOLESTEROLEMIA: ICD-10-CM

## 2025-03-27 DIAGNOSIS — R73.03 PREDIABETES: ICD-10-CM

## 2025-03-27 DIAGNOSIS — I10 PRIMARY HYPERTENSION: Primary | ICD-10-CM

## 2025-04-03 ENCOUNTER — APPOINTMENT (OUTPATIENT)
Dept: GASTROENTEROLOGY | Facility: CLINIC | Age: 55
End: 2025-04-03
Payer: COMMERCIAL

## 2025-04-16 ENCOUNTER — APPOINTMENT (OUTPATIENT)
Dept: GASTROENTEROLOGY | Facility: CLINIC | Age: 55
End: 2025-04-16
Payer: COMMERCIAL

## 2025-04-28 ENCOUNTER — APPOINTMENT (OUTPATIENT)
Dept: PRIMARY CARE | Facility: CLINIC | Age: 55
End: 2025-04-28
Payer: COMMERCIAL

## 2025-05-06 ENCOUNTER — APPOINTMENT (OUTPATIENT)
Dept: DERMATOLOGY | Facility: CLINIC | Age: 55
End: 2025-05-06
Payer: COMMERCIAL

## 2025-05-06 DIAGNOSIS — D48.5 NEOPLASM OF UNCERTAIN BEHAVIOR OF SKIN: Primary | ICD-10-CM

## 2025-05-06 DIAGNOSIS — D18.01 HEMANGIOMA OF SKIN: ICD-10-CM

## 2025-05-06 DIAGNOSIS — L57.0 ACTINIC KERATOSIS: ICD-10-CM

## 2025-05-06 DIAGNOSIS — L21.9 SEBORRHEIC DERMATITIS: ICD-10-CM

## 2025-05-06 DIAGNOSIS — D22.5 MELANOCYTIC NEVUS OF TRUNK: ICD-10-CM

## 2025-05-06 DIAGNOSIS — L57.8 DIFFUSE PHOTODAMAGE OF SKIN: ICD-10-CM

## 2025-05-06 PROCEDURE — 11301 SHAVE SKIN LESION 0.6-1.0 CM: CPT | Performed by: DERMATOLOGY

## 2025-05-06 PROCEDURE — 11311 SHAVE SKIN LESION 0.6-1.0 CM: CPT | Performed by: DERMATOLOGY

## 2025-05-06 PROCEDURE — 99204 OFFICE O/P NEW MOD 45 MIN: CPT | Performed by: DERMATOLOGY

## 2025-05-06 PROCEDURE — 17004 DESTROY PREMAL LESIONS 15/>: CPT | Performed by: DERMATOLOGY

## 2025-05-06 RX ORDER — KETOCONAZOLE 20 MG/ML
SHAMPOO, SUSPENSION TOPICAL
Qty: 120 ML | Refills: 11 | Status: SHIPPED | OUTPATIENT
Start: 2025-05-06

## 2025-05-06 ASSESSMENT — DERMATOLOGY QUALITY OF LIFE (QOL) ASSESSMENT: ARE THERE EXCLUSIONS OR EXCEPTIONS FOR THE QUALITY OF LIFE ASSESSMENT: NO

## 2025-05-06 ASSESSMENT — ITCH NUMERIC RATING SCALE: HOW SEVERE IS YOUR ITCHING?: 0

## 2025-05-06 ASSESSMENT — DERMATOLOGY PATIENT ASSESSMENT
DO YOU USE SUNSCREEN: OCCASIONALLY
DO YOU HAVE ANY NEW OR CHANGING LESIONS: NO
ARE YOU AN ORGAN TRANSPLANT RECIPIENT: NO
DO YOU USE A TANNING BED: NO

## 2025-05-06 NOTE — PROGRESS NOTES
Subjective     Sonu Timmons is a 54 y.o. male who presents for the following: Skin Check.  He notes a scaly, scabby bump on his left ear, which has been present for 1 year, hurts, and does not heal.  He also notes a red spot on his right thigh, which has been present for several months and intermittently scales and scabs and does not heal.  Lastly, he notes a dry, flaky scalp.  He denies any other new, changing, or concerning skin lesions; no bleeding, itching, or burning lesions.      Review of Systems:  No other skin or systemic complaints other than what is documented elsewhere in the note.    The following portions of the chart were reviewed this encounter and updated as appropriate:       Skin Cancer History  Biopsy Log Book  No skin cancers from Specimen Tracking.    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    Current Medications / CAM's:  Current Medications[1]     Objective   Well appearing patient in no apparent distress; mood and affect are within normal limits.    A full examination was performed including scalp, face, eyes, ears, nose, lips, neck, chest, axillae, abdomen, back, bilateral upper extremities, and bilateral lower extremities. All findings within normal limits unless otherwise noted below.    Assessment/Plan   Skin Exam  1. NEOPLASM OF UNCERTAIN BEHAVIOR OF SKIN (4)  Left Ear Superior Helix  6 mm erythematous, scaly, tender papule     Shave removal    Lesion length (cm):  0.6  Margin per side (cm):  0  Lesion diameter (cm):  0.6  Informed consent: discussed and consent obtained    Timeout:  patient name, date of birth, surgical site, and procedure verified    Procedure prep:  Patient was prepped and draped  Anesthesia: the lesion was anesthetized in a standard fashion    Anesthetic:  1% lidocaine w/ epinephrine 1-100,000 local infiltration  Instrument used: flexible razor blade    Hemostasis achieved with: aluminum chloride    Outcome: patient tolerated procedure well    Post-procedure details: sterile dressing applied and wound care instructions given    Dressing type: bandage and petrolatum      Staff Communication: Dermatology Local Anesthesia: 1 % Lidocaine / Epinephrine - Amount:0.5ml  Specimen 1 - Dermatopathology- DERM LAB  Differential Diagnosis: CNH v SCC  Check Margins Yes/No?:    Comments:    Dermpath Lab: Routine Histopathology (formalin-fixed tissue)  Right Anterior Lateral Distal Thigh  8 mm pink, shiny papule     Shave removal    Lesion length (cm):  0.8  Margin per side (cm):  0  Lesion diameter (cm):  0.8  Informed consent: discussed and consent obtained    Timeout: patient name, date of birth, surgical site, and procedure verified    Procedure prep:  Patient was prepped and draped  Anesthesia: the lesion was anesthetized in a standard fashion    Anesthetic:  1% lidocaine w/ epinephrine 1-100,000 local infiltration  Instrument used: flexible razor blade    Hemostasis achieved with: aluminum chloride    Outcome: patient tolerated procedure well    Post-procedure details: sterile dressing applied and wound care instructions given    Dressing type: bandage and petrolatum      Staff Communication: Dermatology Local Anesthesia: 1 % Lidocaine / Epinephrine - Amount:0.5ml  Specimen 2 - Dermatopathology- DERM LAB  Differential Diagnosis: DF v BCC v scar  Check Margins Yes/No?:    Comments:    Dermpath Lab: Routine Histopathology (formalin-fixed tissue)  Left Medial Lower Back  6 mm dark brown pigmented, asymmetric macule with an asymmetric pigment network and irregular borders    Shave  removal    Lesion length (cm):  0.6  Margin per side (cm):  0.2  Lesion diameter (cm):  1  Informed consent: discussed and consent obtained    Timeout: patient name, date of birth, surgical site, and procedure verified    Procedure prep:  Patient was prepped and draped  Anesthesia: the lesion was anesthetized in a standard fashion    Anesthetic:  1% lidocaine w/ epinephrine 1-100,000 local infiltration  Instrument used: flexible razor blade    Hemostasis achieved with: aluminum chloride    Outcome: patient tolerated procedure well    Post-procedure details: sterile dressing applied and wound care instructions given    Dressing type: bandage and petrolatum      Staff Communication: Dermatology Local Anesthesia: 1 % Lidocaine / Epinephrine - Amount:0.5ml  Specimen 3 - Dermatopathology- DERM LAB  Differential Diagnosis: DN  Check Margins Yes/No?:    Comments:    Dermpath Lab: Routine Histopathology (formalin-fixed tissue)  Left Distal Dorsal Forearm  6 mm dark brown pigmented, asymmetric macule with an asymmetric pigment network and irregular borders     Shave removal    Lesion length (cm):  0.6  Margin per side (cm):  0.2  Lesion diameter (cm):  1  Informed consent: discussed and consent obtained    Timeout: patient name, date of birth, surgical site, and procedure verified    Procedure prep:  Patient was prepped and draped  Anesthesia: the lesion was anesthetized in a standard fashion    Anesthetic:  1% lidocaine w/ epinephrine 1-100,000 local infiltration  Instrument used: flexible razor blade    Hemostasis achieved with: aluminum chloride    Outcome: patient tolerated procedure well    Post-procedure details: sterile dressing applied and wound care instructions given    Dressing type: bandage and petrolatum      Staff Communication: Dermatology Local Anesthesia: 1 % Lidocaine / Epinephrine - Amount:0.5ml  Specimen 4 - Dermatopathology- DERM LAB  Differential Diagnosis: DN  Check Margins Yes/No?:    Comments:     Dermpath Lab: Routine Histopathology (formalin-fixed tissue)  2. ACTINIC KERATOSIS (16)  Head - Anterior (Face) (16)  Scattered on the patient's face and bilateral ears, there are multiple erythematous, gritty, scaly macules   Actinic Keratoses -scattered on face and bilateral ears.  The pre-cancerous nature of these lesions and treatment options were discussed with the patient today.  At this time, I recommend treatment with liquid nitrogen cryotherapy.  The patient expressed understanding, is in agreement with this plan, and wishes to proceed with cryotherapy today.  Destr of lesion - Head - Anterior (Face) (16)  Complexity: simple    Destruction method: cryotherapy    Informed consent: discussed and consent obtained    Lesion destroyed using liquid nitrogen: Yes    Cryotherapy cycles:  1  Outcome: patient tolerated procedure well with no complications    Post-procedure details: wound care instructions given    3. MELANOCYTIC NEVUS OF TRUNK  Generalized  Scattered on the patient's face, neck, trunk, and extremities, there are multiple small, round- to oval-shaped, brown-pigmented and pink-colored, symmetric, uniform-appearing macules and dome-shaped papules  Clinically benign- to slightly atypical-appearing nevi - the clinically benign- to slightly atypical-appearing nature of the remainder of the patient's nevi was discussed with the patient today.  None of the patient's nevi, with the exception of the 2 noted above, meet threshold for biopsy today.  I emphasized the importance of performing monthly self-skin exams using the ABCDs of monitoring moles, which were reviewed with the patient today and an informational hand-out provided.  I also emphasized the importance of sun avoidance and sun protection with daily sunscreen use.  4. HEMANGIOMA OF SKIN  Generalized  Scattered on the patient's face, neck, trunk, and extremities, there are multiple small, round, cherry red- to purplish-colored, symmetric, uniform,  vascular-appearing macules and papules  Cherry Angiomas - the benign nature of these vascular lesions was discussed with the patient today and reassurance provided.  No treatment is medically indicated for these lesions at this time.  5. SEBORRHEIC DERMATITIS  Scalp  On the patient's scalp, there are pink, scaly patches with whitish-yellowish, greasy scale  Seborrheic Dermatitis - scalp.  The potentially chronic and intermittently flaring nature of this condition and treatment options were discussed extensively with the patient today.  At this time, I recommend topical anti-fungal therapy with Ketoconazole 2% shampoo, which the patient was instructed to use 2-3 days per week, alternating with over-the-counter anti-dandruff shampoos, such as Head & Shoulders, Selsun Blue, and Neutrogena T-gel, every month.  The risks, benefits, and side effects of this medication were discussed.  The patient expressed understanding and is in agreement with this plan.  ketoconazole (NIZOral) 2 % shampoo - Scalp  Wash affected areas of scalp 2-3 times weekly as directed  6. DIFFUSE PHOTODAMAGE OF SKIN  Photodistributed  Diffuse photodamage with actinic changes with telangiectasia and mottled pigmentation in sun-exposed areas.  Photodamage.  The signs and symptoms of skin cancer were reviewed and the patient was advised to practice sun protection and sun avoidance, use daily sunscreen, and perform regular self skin exams.  Sun protection was discussed, including avoiding the mid-day sun, wearing a sunscreen with SPF at least 50, and stressing the need for reapplication of sunscreen and applying more than they think they need.          [1]   Current Outpatient Medications:     ketoconazole (NIZOral) 2 % shampoo, Wash affected areas of scalp 2-3 times weekly as directed, Disp: 120 mL, Rfl: 11

## 2025-05-06 NOTE — Clinical Note
6 mm dark brown pigmented, asymmetric macule with an asymmetric pigment network and irregular borders

## 2025-05-06 NOTE — Clinical Note
Scattered on the patient's face and bilateral ears, there are multiple erythematous, gritty, scaly macules

## 2025-05-06 NOTE — Clinical Note
Cardoza Angiomas - the benign nature of these vascular lesions was discussed with the patient today and reassurance provided.  No treatment is medically indicated for these lesions at this time.

## 2025-05-06 NOTE — Clinical Note
Actinic Keratoses -scattered on face and bilateral ears.  The pre-cancerous nature of these lesions and treatment options were discussed with the patient today.  At this time, I recommend treatment with liquid nitrogen cryotherapy.  The patient expressed understanding, is in agreement with this plan, and wishes to proceed with cryotherapy today.

## 2025-05-12 LAB
LAB AP ASR DISCLAIMER: NORMAL
LABORATORY COMMENT REPORT: NORMAL
PATH REPORT.FINAL DX SPEC: NORMAL
PATH REPORT.GROSS SPEC: NORMAL
PATH REPORT.MICROSCOPIC SPEC OTHER STN: NORMAL
PATH REPORT.RELEVANT HX SPEC: NORMAL
PATH REPORT.TOTAL CANCER: NORMAL

## (undated) DEVICE — WOUND SYSTEM, DEBRIDEMENT & CLEANING, O.R DUOPAK

## (undated) DEVICE — Device

## (undated) DEVICE — SOLUTION, INJECTION, SODIUM CHLORIDE 9%, 500ML

## (undated) DEVICE — SOLUTION, CHLORHEXIDINE, 4%, 4OZ

## (undated) DEVICE — GLOVE, SURGICAL, PROTEXIS PI BLUE W/NEUTHERA, 7.0, PF, LF

## (undated) DEVICE — SOLUTION, IRRIGATION, X RX SODIUM CHL 0.9%, 1000ML BTL

## (undated) DEVICE — SPLINT, FAST SETTING, 5 X 30 IN, PLASTER

## (undated) DEVICE — DRESSING, GAUZE, VERSALON, 4 PLY, 4 X 4 IN, STERILE

## (undated) DEVICE — GLOVE, SURGICAL, PROTEXIS PI , 6.5, PF, LF

## (undated) DEVICE — PADDING, UNDERCAST, WEBRIL, 4 IN X 4 YD, REG, NS

## (undated) DEVICE — SUTURE, CTD, VICRYL, 2-0, UND, BR, CT-2

## (undated) DEVICE — PENCIL, ELECTROSURG, W/BUTTON SWITCH & HOLSTER, EZ CLEAN, DISP

## (undated) DEVICE — GLOVE, SURGICAL, PROTEXIS PI , 7.5, PF, LF

## (undated) DEVICE — TRAY, DRY PREP, PREMIUM

## (undated) DEVICE — CUFF, TOURNIQUET, 18 X 4, DUAL PORT/SNGL BLADDER, DISP, LF

## (undated) DEVICE — TIP, SUCTION, YANKAUER, FLEXIBLE

## (undated) DEVICE — SUTURE, PROLENE, 3-0, 18 IN, PS2, BLUE

## (undated) DEVICE — BLANKET, LOWER BODY, VHA PLUS, ADULT

## (undated) DEVICE — GOWN, SURGICAL, SIRUS, NON REINFORCED, LARGE

## (undated) DEVICE — SPLINT, FAST SETTING, 4 X 15 IN, PLASTER

## (undated) DEVICE — NEEDLE, ELECTRODE, ELECTROSURGICAL, INSULATED

## (undated) DEVICE — DRESSING, ABDOMINAL, TENDERSORB, 8 X 7-1/2 IN, STERILE

## (undated) DEVICE — DRESSING, NON-ADHERENT, 3 X 3 IN, STERILE

## (undated) DEVICE — TUBING, SUCTION, 6MM X 10, CLEAN N-COND